# Patient Record
Sex: FEMALE | Race: WHITE | Employment: OTHER | URBAN - METROPOLITAN AREA
[De-identification: names, ages, dates, MRNs, and addresses within clinical notes are randomized per-mention and may not be internally consistent; named-entity substitution may affect disease eponyms.]

---

## 2017-05-19 ENCOUNTER — OFFICE VISIT (OUTPATIENT)
Dept: SURGERY | Age: 82
End: 2017-05-19

## 2017-05-19 VITALS
HEIGHT: 66 IN | SYSTOLIC BLOOD PRESSURE: 128 MMHG | DIASTOLIC BLOOD PRESSURE: 64 MMHG | HEART RATE: 56 BPM | BODY MASS INDEX: 30.37 KG/M2 | WEIGHT: 189 LBS

## 2017-05-19 DIAGNOSIS — C73 THYROID CANCER (HCC): ICD-10-CM

## 2017-05-19 DIAGNOSIS — E89.0 POSTOPERATIVE HYPOTHYROIDISM: Primary | ICD-10-CM

## 2017-05-19 PROCEDURE — 99213 OFFICE O/P EST LOW 20 MIN: CPT | Performed by: INTERNAL MEDICINE

## 2017-05-19 PROCEDURE — 1123F ACP DISCUSS/DSCN MKR DOCD: CPT | Performed by: INTERNAL MEDICINE

## 2017-05-19 PROCEDURE — 4040F PNEUMOC VAC/ADMIN/RCVD: CPT | Performed by: INTERNAL MEDICINE

## 2017-05-19 PROCEDURE — 1036F TOBACCO NON-USER: CPT | Performed by: INTERNAL MEDICINE

## 2017-05-19 PROCEDURE — G8427 DOCREV CUR MEDS BY ELIG CLIN: HCPCS | Performed by: INTERNAL MEDICINE

## 2017-05-19 PROCEDURE — 1090F PRES/ABSN URINE INCON ASSESS: CPT | Performed by: INTERNAL MEDICINE

## 2017-05-19 PROCEDURE — G8400 PT W/DXA NO RESULTS DOC: HCPCS | Performed by: INTERNAL MEDICINE

## 2017-05-19 PROCEDURE — G8419 CALC BMI OUT NRM PARAM NOF/U: HCPCS | Performed by: INTERNAL MEDICINE

## 2017-05-19 RX ORDER — LEVOTHYROXINE SODIUM 0.12 MG/1
125 TABLET ORAL DAILY
Qty: 90 TABLET | Refills: 3 | Status: SHIPPED | OUTPATIENT
Start: 2017-05-19 | End: 2018-05-14 | Stop reason: SDUPTHER

## 2018-05-07 DIAGNOSIS — E89.0 POSTOPERATIVE HYPOTHYROIDISM: ICD-10-CM

## 2018-05-07 LAB
ANION GAP SERPL CALCULATED.3IONS-SCNC: 14 MEQ/L (ref 7–13)
BUN BLDV-MCNC: 32 MG/DL (ref 8–23)
CALCIUM SERPL-MCNC: 8.9 MG/DL (ref 8.6–10.2)
CHLORIDE BLD-SCNC: 108 MEQ/L (ref 98–107)
CO2: 25 MEQ/L (ref 22–29)
CREAT SERPL-MCNC: 1.06 MG/DL (ref 0.5–0.9)
GFR AFRICAN AMERICAN: 59.6
GFR NON-AFRICAN AMERICAN: 49.3
GLUCOSE BLD-MCNC: 88 MG/DL (ref 74–109)
POTASSIUM SERPL-SCNC: 4.5 MEQ/L (ref 3.5–5.1)
SODIUM BLD-SCNC: 147 MEQ/L (ref 132–144)
T4 FREE: 1.56 NG/DL (ref 0.93–1.7)
TSH SERPL DL<=0.05 MIU/L-ACNC: 3.94 UIU/ML (ref 0.27–4.2)

## 2018-05-12 LAB — THYROGLOBULIN BY LC-MS/MS, SERUM/PLASMA: <0.5 NG/ML (ref 1.3–31.8)

## 2018-05-14 ENCOUNTER — OFFICE VISIT (OUTPATIENT)
Dept: ENDOCRINOLOGY | Age: 83
End: 2018-05-14
Payer: MEDICARE

## 2018-05-14 VITALS
WEIGHT: 180 LBS | DIASTOLIC BLOOD PRESSURE: 76 MMHG | HEIGHT: 66 IN | SYSTOLIC BLOOD PRESSURE: 138 MMHG | BODY MASS INDEX: 28.93 KG/M2 | HEART RATE: 52 BPM

## 2018-05-14 DIAGNOSIS — E89.0 POSTOPERATIVE HYPOTHYROIDISM: Primary | ICD-10-CM

## 2018-05-14 DIAGNOSIS — N18.30 STAGE 3 CHRONIC KIDNEY DISEASE (HCC): ICD-10-CM

## 2018-05-14 PROCEDURE — 99213 OFFICE O/P EST LOW 20 MIN: CPT | Performed by: INTERNAL MEDICINE

## 2018-05-14 PROCEDURE — G8400 PT W/DXA NO RESULTS DOC: HCPCS | Performed by: INTERNAL MEDICINE

## 2018-05-14 PROCEDURE — G8427 DOCREV CUR MEDS BY ELIG CLIN: HCPCS | Performed by: INTERNAL MEDICINE

## 2018-05-14 PROCEDURE — G8419 CALC BMI OUT NRM PARAM NOF/U: HCPCS | Performed by: INTERNAL MEDICINE

## 2018-05-14 PROCEDURE — 1036F TOBACCO NON-USER: CPT | Performed by: INTERNAL MEDICINE

## 2018-05-14 PROCEDURE — 1090F PRES/ABSN URINE INCON ASSESS: CPT | Performed by: INTERNAL MEDICINE

## 2018-05-14 PROCEDURE — 1123F ACP DISCUSS/DSCN MKR DOCD: CPT | Performed by: INTERNAL MEDICINE

## 2018-05-14 PROCEDURE — 4040F PNEUMOC VAC/ADMIN/RCVD: CPT | Performed by: INTERNAL MEDICINE

## 2018-05-14 RX ORDER — LATANOPROST 50 UG/ML
SOLUTION/ DROPS OPHTHALMIC
COMMUNITY
Start: 2017-08-09

## 2018-05-14 RX ORDER — LEVOTHYROXINE SODIUM 0.12 MG/1
125 TABLET ORAL DAILY
Qty: 90 TABLET | Refills: 3 | Status: SHIPPED | OUTPATIENT
Start: 2018-05-14 | End: 2019-05-15 | Stop reason: SDUPTHER

## 2018-10-24 ENCOUNTER — OFFICE VISIT (OUTPATIENT)
Dept: CARDIOLOGY CLINIC | Age: 83
End: 2018-10-24
Payer: MEDICARE

## 2018-10-24 VITALS
BODY MASS INDEX: 29.25 KG/M2 | OXYGEN SATURATION: 98 % | HEIGHT: 66 IN | HEART RATE: 51 BPM | DIASTOLIC BLOOD PRESSURE: 80 MMHG | WEIGHT: 182 LBS | SYSTOLIC BLOOD PRESSURE: 130 MMHG

## 2018-10-24 DIAGNOSIS — E78.5 HYPERLIPIDEMIA, UNSPECIFIED HYPERLIPIDEMIA TYPE: ICD-10-CM

## 2018-10-24 DIAGNOSIS — R00.2 PALPITATIONS: ICD-10-CM

## 2018-10-24 DIAGNOSIS — I10 ESSENTIAL HYPERTENSION: Primary | ICD-10-CM

## 2018-10-24 PROCEDURE — 1123F ACP DISCUSS/DSCN MKR DOCD: CPT | Performed by: INTERNAL MEDICINE

## 2018-10-24 PROCEDURE — 1036F TOBACCO NON-USER: CPT | Performed by: INTERNAL MEDICINE

## 2018-10-24 PROCEDURE — G8400 PT W/DXA NO RESULTS DOC: HCPCS | Performed by: INTERNAL MEDICINE

## 2018-10-24 PROCEDURE — 1090F PRES/ABSN URINE INCON ASSESS: CPT | Performed by: INTERNAL MEDICINE

## 2018-10-24 PROCEDURE — G8427 DOCREV CUR MEDS BY ELIG CLIN: HCPCS | Performed by: INTERNAL MEDICINE

## 2018-10-24 PROCEDURE — 4040F PNEUMOC VAC/ADMIN/RCVD: CPT | Performed by: INTERNAL MEDICINE

## 2018-10-24 PROCEDURE — G8419 CALC BMI OUT NRM PARAM NOF/U: HCPCS | Performed by: INTERNAL MEDICINE

## 2018-10-24 PROCEDURE — 1101F PT FALLS ASSESS-DOCD LE1/YR: CPT | Performed by: INTERNAL MEDICINE

## 2018-10-24 PROCEDURE — G8484 FLU IMMUNIZE NO ADMIN: HCPCS | Performed by: INTERNAL MEDICINE

## 2018-10-24 PROCEDURE — 99204 OFFICE O/P NEW MOD 45 MIN: CPT | Performed by: INTERNAL MEDICINE

## 2018-10-24 ASSESSMENT — ENCOUNTER SYMPTOMS
COLOR CHANGE: 0
BACK PAIN: 0
ABDOMINAL DISTENTION: 0
CHEST TIGHTNESS: 0
CHOKING: 0
APNEA: 0
ABDOMINAL PAIN: 0

## 2018-10-24 NOTE — PROGRESS NOTES
mouth daily. No current facility-administered medications for this visit. Review of Systems:   Review of Systems   Constitutional: Negative for activity change and appetite change. HENT: Negative for congestion. Respiratory: Negative for apnea, choking and chest tightness. Cardiovascular: Negative for chest pain. Gastrointestinal: Negative for abdominal distention and abdominal pain. Endocrine: Negative for cold intolerance and heat intolerance. Genitourinary: Negative for dysuria and enuresis. Musculoskeletal: Negative for arthralgias and back pain. Skin: Negative for color change. Neurological: Negative for dizziness, seizures, syncope and light-headedness. Psychiatric/Behavioral: Negative for agitation, behavioral problems and confusion. Physical Examination:    /80 (Site: Left Upper Arm, Position: Sitting, Cuff Size: Large Adult)   Pulse 51   Ht 5' 6\" (1.676 m)   Wt 182 lb (82.6 kg)   SpO2 98%   BMI 29.38 kg/m²    Physical Exam   Constitutional: She appears healthy. No distress. HENT:   Mouth/Throat: Oropharynx is clear. Eyes: Pupils are equal, round, and reactive to light. Neck: Normal range of motion. No JVD present. Cardiovascular: Regular rhythm, S1 normal, S2 normal, normal heart sounds and intact distal pulses. Exam reveals no gallop. No murmur heard. Pulses:       Radial pulses are 2+ on the right side. Dorsalis pedis pulses are 2+ on the right side. Pulmonary/Chest: Effort normal and breath sounds normal. She has no wheezes. She has no rales. She exhibits no tenderness. Abdominal: Soft. Bowel sounds are normal.   Musculoskeletal: Normal range of motion. She exhibits no edema. Neurological: She is alert and oriented to person, place, and time. She has intact cranial nerves. Skin: Skin is warm and dry. No rash noted.        LABS:  CBC: No results found for: WBC, RBC, HGB, HCT, MCV, MCH, MCHC, RDW, PLT, MPV  Lipids:No results

## 2019-05-08 DIAGNOSIS — E89.0 POSTOPERATIVE HYPOTHYROIDISM: ICD-10-CM

## 2019-05-08 LAB
ANION GAP SERPL CALCULATED.3IONS-SCNC: 14 MEQ/L (ref 9–15)
BUN BLDV-MCNC: 28 MG/DL (ref 8–23)
CALCIUM SERPL-MCNC: 9.3 MG/DL (ref 8.5–9.9)
CHLORIDE BLD-SCNC: 106 MEQ/L (ref 95–107)
CO2: 24 MEQ/L (ref 20–31)
CREAT SERPL-MCNC: 1.06 MG/DL (ref 0.5–0.9)
GFR AFRICAN AMERICAN: 59.5
GFR NON-AFRICAN AMERICAN: 49.1
GLUCOSE BLD-MCNC: 111 MG/DL (ref 70–99)
POTASSIUM SERPL-SCNC: 4.3 MEQ/L (ref 3.4–4.9)
SODIUM BLD-SCNC: 144 MEQ/L (ref 135–144)
T4 FREE: 1.79 NG/DL (ref 0.84–1.68)
TSH SERPL DL<=0.05 MIU/L-ACNC: 1.82 UIU/ML (ref 0.44–3.86)

## 2019-05-15 ENCOUNTER — OFFICE VISIT (OUTPATIENT)
Dept: ENDOCRINOLOGY | Age: 84
End: 2019-05-15
Payer: MEDICARE

## 2019-05-15 VITALS
HEART RATE: 51 BPM | WEIGHT: 181 LBS | HEIGHT: 63 IN | SYSTOLIC BLOOD PRESSURE: 140 MMHG | DIASTOLIC BLOOD PRESSURE: 81 MMHG | BODY MASS INDEX: 32.07 KG/M2

## 2019-05-15 DIAGNOSIS — E89.0 POSTOPERATIVE HYPOTHYROIDISM: Primary | ICD-10-CM

## 2019-05-15 PROCEDURE — 1123F ACP DISCUSS/DSCN MKR DOCD: CPT | Performed by: INTERNAL MEDICINE

## 2019-05-15 PROCEDURE — 1036F TOBACCO NON-USER: CPT | Performed by: INTERNAL MEDICINE

## 2019-05-15 PROCEDURE — G8417 CALC BMI ABV UP PARAM F/U: HCPCS | Performed by: INTERNAL MEDICINE

## 2019-05-15 PROCEDURE — 4040F PNEUMOC VAC/ADMIN/RCVD: CPT | Performed by: INTERNAL MEDICINE

## 2019-05-15 PROCEDURE — 1090F PRES/ABSN URINE INCON ASSESS: CPT | Performed by: INTERNAL MEDICINE

## 2019-05-15 PROCEDURE — G8427 DOCREV CUR MEDS BY ELIG CLIN: HCPCS | Performed by: INTERNAL MEDICINE

## 2019-05-15 PROCEDURE — 99213 OFFICE O/P EST LOW 20 MIN: CPT | Performed by: INTERNAL MEDICINE

## 2019-05-15 RX ORDER — LEVOTHYROXINE SODIUM 0.12 MG/1
125 TABLET ORAL DAILY
Qty: 90 TABLET | Refills: 3 | Status: SHIPPED | OUTPATIENT
Start: 2019-05-15

## 2019-05-15 NOTE — PROGRESS NOTES
Subjective:      Patient ID: Abimbola Silver is a 80 y.o. female. 12 months f/u hypothyroidism   Other   This is a chronic (hypothyroidism) problem. The current episode started more than 1 year ago. The problem has been unchanged. Associated symptoms include fatigue. Exacerbated by: thyroidectomy. Treatments tried: synthyroid. The treatment provided moderate relief. Pt c/o fatigue and has to take naps     Does not want to see a neurologist for her myasthenia gravis which is in remission  as per patient    Results for [place] Grade (MRN [id number]) as of 5/15/2019 13:35   Ref. Range 5/8/2019 11:16   Sodium Latest Ref Range: 135 - 144 mEq/L 144   Potassium Latest Ref Range: 3.4 - 4.9 mEq/L 4.3   Chloride Latest Ref Range: 95 - 107 mEq/L 106   CO2 Latest Ref Range: 20 - 31 mEq/L 24   BUN Latest Ref Range: 8 - 23 mg/dL 28 (H)   Creatinine Latest Ref Range: 0.50 - 0.90 mg/dL 1.06 (H)   Anion Gap Latest Ref Range: 9 - 15 mEq/L 14   GFR Non- Latest Ref Range: >60  49.1 (L)   GFR  Latest Ref Range: >60  59.5 (L)   Glucose Latest Ref Range: 70 - 99 mg/dL 111 (H)   Calcium Latest Ref Range: 8.5 - 9.9 mg/dL 9.3   TSH Latest Ref Range: 0.440 - 3.860 uIU/mL 1.820   T4 Free Latest Ref Range: 0.84 - 1.68 ng/dL 1.79 (H)           Patient Active Problem List   Diagnosis    Hypothyroidism    Thyroid cancer (HCC)    Myasthenia gravis (HCC)    CKD (chronic kidney disease)       Allergies   Allergen Reactions    Morphine     Promethazine     Sulfa Antibiotics          Current Outpatient Medications:     acetaminophen (TYLENOL) 80 MG suppository, Place 80 mg rectally every 4 hours as needed for Fever, Disp: , Rfl:     aspirin 81 MG tablet, Take by mouth MON-WED-FRI ONLY, Disp: , Rfl:     levothyroxine (SYNTHROID) 125 MCG tablet, Take 1 tablet by mouth Daily, Disp: 90 tablet, Rfl: 03    latanoprost (XALATAN) 0.005 % ophthalmic solution, Use 1 Drop in both eyes daily at bedtime. , Disp: , Rfl:     Cholecalciferol (VITAMIN D3) 2000 UNITS CAPS, Take  by mouth Daily. , Disp: , Rfl:     amlodipine (NORVASC) 5 MG tablet, Take 5 mg by mouth daily. , Disp: , Rfl:     atenolol (TENORMIN) 25 MG tablet, Take 25 mg by mouth daily. , Disp: , Rfl:     ezetimibe (ZETIA) 10 MG tablet, Take 10 mg by mouth daily. , Disp: , Rfl:             Review of Systems   Constitutional: Positive for fatigue. All other systems reviewed and are negative. Vitals:    05/15/19 1325 05/15/19 1329   BP: (!) 148/80 (!) 140/81   Pulse: 51    Weight: 181 lb (82.1 kg)    Height: 5' 3\" (1.6 m)        Physical Exam   Constitutional: She appears well-developed and well-nourished. HENT:   Head: Normocephalic and atraumatic. Neck: Neck supple. Cardiovascular: Bradycardia present. Musculoskeletal: Normal range of motion. Neurological: She is alert. Psychiatric: She has a normal mood and affect. Assessment:       Diagnosis Orders   1.  Postoperative hypothyroidism             Plan:       Orders Placed This Encounter   Procedures    T4, Free     Standing Status:   Future     Standing Expiration Date:   5/15/2020    TSH without Reflex     Standing Status:   Future     Standing Expiration Date:   5/15/2020    Basic Metabolic Panel     Standing Status:   Future     Standing Expiration Date:   5/15/2020    Thyroglobulin     Standing Status:   Future     Standing Expiration Date:   5/15/2020     Orders Placed This Encounter   Medications    levothyroxine (SYNTHROID) 125 MCG tablet     Sig: Take 1 tablet by mouth Daily     Dispense:  90 tablet     Refill:  03         f/u in 12 months

## 2019-05-23 LAB — THYROGLOBULIN BY LC-MS/MS, SERUM/PLASMA: 1.2 NG/ML (ref 1.3–31.8)

## 2019-10-03 ENCOUNTER — OFFICE VISIT (OUTPATIENT)
Dept: CARDIOLOGY CLINIC | Age: 84
End: 2019-10-03
Payer: MEDICARE

## 2019-10-03 VITALS
OXYGEN SATURATION: 97 % | BODY MASS INDEX: 32.6 KG/M2 | DIASTOLIC BLOOD PRESSURE: 70 MMHG | SYSTOLIC BLOOD PRESSURE: 120 MMHG | HEART RATE: 57 BPM | WEIGHT: 184 LBS | HEIGHT: 63 IN

## 2019-10-03 DIAGNOSIS — G70.00 MYASTHENIA GRAVIS (HCC): ICD-10-CM

## 2019-10-03 DIAGNOSIS — I47.1 SVT (SUPRAVENTRICULAR TACHYCARDIA) (HCC): Primary | ICD-10-CM

## 2019-10-03 DIAGNOSIS — E78.5 HYPERLIPIDEMIA, UNSPECIFIED HYPERLIPIDEMIA TYPE: ICD-10-CM

## 2019-10-03 DIAGNOSIS — I10 ESSENTIAL HYPERTENSION: ICD-10-CM

## 2019-10-03 DIAGNOSIS — R00.2 PALPITATIONS: ICD-10-CM

## 2019-10-03 DIAGNOSIS — Z00.00 PE (PHYSICAL EXAM), ANNUAL: ICD-10-CM

## 2019-10-03 PROCEDURE — 1036F TOBACCO NON-USER: CPT | Performed by: INTERNAL MEDICINE

## 2019-10-03 PROCEDURE — 4040F PNEUMOC VAC/ADMIN/RCVD: CPT | Performed by: INTERNAL MEDICINE

## 2019-10-03 PROCEDURE — 99212 OFFICE O/P EST SF 10 MIN: CPT | Performed by: INTERNAL MEDICINE

## 2019-10-03 PROCEDURE — 1090F PRES/ABSN URINE INCON ASSESS: CPT | Performed by: INTERNAL MEDICINE

## 2019-10-03 PROCEDURE — G8427 DOCREV CUR MEDS BY ELIG CLIN: HCPCS | Performed by: INTERNAL MEDICINE

## 2019-10-03 PROCEDURE — G8417 CALC BMI ABV UP PARAM F/U: HCPCS | Performed by: INTERNAL MEDICINE

## 2019-10-03 PROCEDURE — G8484 FLU IMMUNIZE NO ADMIN: HCPCS | Performed by: INTERNAL MEDICINE

## 2019-10-03 PROCEDURE — 1123F ACP DISCUSS/DSCN MKR DOCD: CPT | Performed by: INTERNAL MEDICINE

## 2019-11-11 ENCOUNTER — OFFICE VISIT (OUTPATIENT)
Dept: NEUROLOGY | Age: 84
End: 2019-11-11
Payer: MEDICARE

## 2019-11-11 VITALS
DIASTOLIC BLOOD PRESSURE: 77 MMHG | WEIGHT: 183 LBS | BODY MASS INDEX: 32.43 KG/M2 | HEIGHT: 63 IN | HEART RATE: 52 BPM | SYSTOLIC BLOOD PRESSURE: 151 MMHG

## 2019-11-11 DIAGNOSIS — G70.00 MYASTHENIA GRAVIS (HCC): ICD-10-CM

## 2019-11-11 DIAGNOSIS — G70.00 MYASTHENIA GRAVIS (HCC): Primary | ICD-10-CM

## 2019-11-11 PROCEDURE — 99205 OFFICE O/P NEW HI 60 MIN: CPT | Performed by: PSYCHIATRY & NEUROLOGY

## 2019-11-11 RX ORDER — PYRIDOSTIGMINE BROMIDE 60 MG/1
TABLET ORAL
Qty: 30 TABLET | Refills: 3 | Status: SHIPPED | OUTPATIENT
Start: 2019-11-11 | End: 2019-12-16 | Stop reason: SDUPTHER

## 2019-11-11 RX ORDER — PYRIDOSTIGMINE BROMIDE 60 MG/1
60 TABLET ORAL 3 TIMES DAILY
Qty: 60 TABLET | Refills: 3 | Status: SHIPPED | OUTPATIENT
Start: 2019-11-11 | End: 2019-11-11 | Stop reason: SDUPTHER

## 2019-11-11 ASSESSMENT — ENCOUNTER SYMPTOMS
NAUSEA: 0
BACK PAIN: 0
SHORTNESS OF BREATH: 0
TROUBLE SWALLOWING: 0
VOMITING: 0
PHOTOPHOBIA: 0
CHOKING: 0

## 2019-11-14 LAB — ACETYLCHOL MODUL AB: 20 %

## 2019-11-15 LAB — ACETYLCHOLINE BINDING ANTIBODY: 0.6 NMOL/L (ref 0–0.4)

## 2019-12-16 RX ORDER — PYRIDOSTIGMINE BROMIDE 60 MG/1
TABLET ORAL
Qty: 10 TABLET | Refills: 0 | Status: SHIPPED | OUTPATIENT
Start: 2019-12-16 | End: 2020-01-31 | Stop reason: SDUPTHER

## 2020-01-31 ENCOUNTER — OFFICE VISIT (OUTPATIENT)
Dept: NEUROLOGY | Age: 85
End: 2020-01-31
Payer: MEDICARE

## 2020-01-31 VITALS — BODY MASS INDEX: 33.3 KG/M2 | DIASTOLIC BLOOD PRESSURE: 62 MMHG | SYSTOLIC BLOOD PRESSURE: 132 MMHG | WEIGHT: 188 LBS

## 2020-01-31 PROCEDURE — 1036F TOBACCO NON-USER: CPT | Performed by: PSYCHIATRY & NEUROLOGY

## 2020-01-31 PROCEDURE — 99214 OFFICE O/P EST MOD 30 MIN: CPT | Performed by: PSYCHIATRY & NEUROLOGY

## 2020-01-31 PROCEDURE — 1090F PRES/ABSN URINE INCON ASSESS: CPT | Performed by: PSYCHIATRY & NEUROLOGY

## 2020-01-31 PROCEDURE — 1123F ACP DISCUSS/DSCN MKR DOCD: CPT | Performed by: PSYCHIATRY & NEUROLOGY

## 2020-01-31 PROCEDURE — G8427 DOCREV CUR MEDS BY ELIG CLIN: HCPCS | Performed by: PSYCHIATRY & NEUROLOGY

## 2020-01-31 PROCEDURE — 4040F PNEUMOC VAC/ADMIN/RCVD: CPT | Performed by: PSYCHIATRY & NEUROLOGY

## 2020-01-31 PROCEDURE — G8417 CALC BMI ABV UP PARAM F/U: HCPCS | Performed by: PSYCHIATRY & NEUROLOGY

## 2020-01-31 PROCEDURE — G8482 FLU IMMUNIZE ORDER/ADMIN: HCPCS | Performed by: PSYCHIATRY & NEUROLOGY

## 2020-01-31 RX ORDER — PYRIDOSTIGMINE BROMIDE 60 MG/1
60 TABLET ORAL 2 TIMES DAILY
Qty: 180 TABLET | Refills: 1 | Status: SHIPPED | OUTPATIENT
Start: 2020-01-31 | End: 2021-02-12 | Stop reason: SDUPTHER

## 2020-01-31 ASSESSMENT — ENCOUNTER SYMPTOMS
BACK PAIN: 0
CHOKING: 0
NAUSEA: 0
VOMITING: 0
SHORTNESS OF BREATH: 0
PHOTOPHOBIA: 0
TROUBLE SWALLOWING: 0

## 2020-01-31 NOTE — PROGRESS NOTES
Subjective:      Patient ID: Keyana Luque is a 80 y.o. female who presents today for:  Chief Complaint   Patient presents with    Follow-up     patient is getting stronger she feels like the medication she is on is helping she is now able to lift things out of the cabnet that she wasnt able to before. HPI 68-year-old right-handed female with a known history of myasthenia gravis. Patient was seen here due to worsening of myasthenia gravis. We had seen her  a few years ago and she did not not think that she had myasthenia gravis though at that time she had elevated titers of acetylcholine receptor antibodies. A repeat titer showed binding antibodies to be elevated. And this is quite diagnostic of myasthenia gravis. Her blocking antibodies appear to be normal.  Patient is responsive to Mestinon and she is only on 30 mg twice a day without any GI side effects. Notes positive response to this with more ability to perform her tasks and she has not any major issues with speech. The only complaint she has is nighttime coughing which may be part of difficulty swallowing at night. Has any other side effects of medication her only other issues appears to be chronic low back pain she has had 2 operative procedures done and she is now seeing pain management which is helping. Past Medical History:   Diagnosis Date    Hyperlipidemia     Hypothyroidism     Myasthenia gravis ([facility] [address]      No past surgical history on file.   Social History     Socioeconomic History    Marital status:      Spouse name: Not on file    Number of children: Not on file    Years of education: Not on file    Highest education level: Not on file   Occupational History    Not on file   Social Needs    Financial resource strain: Not on file    Food insecurity:     Worry: Not on file     Inability: Not on file    Transportation needs:     Medical: Not on file     Non-medical: Not on file   Tobacco Use    Smoking status: Never Smoker    Smokeless tobacco: Never Used   Substance and Sexual Activity    Alcohol use: No    Drug use: No    Sexual activity: Not on file   Lifestyle    Physical activity:     Days per week: Not on file     Minutes per session: Not on file    Stress: Not on file   Relationships    Social connections:     Talks on phone: Not on file     Gets together: Not on file     Attends [employer] service: Not on file     Active member of club or organization: Not on file     Attends meetings of clubs or organizations: Not on file     Relationship status: Not on file    Intimate partner violence:     Fear of current or ex partner: Not on file     Emotionally abused: Not on file     Physically abused: Not on file     Forced sexual activity: Not on file   Other Topics Concern    Not on file   Social History Narrative    Not on file     No family history on file. Allergies   Allergen Reactions    Morphine     Progesterone     Promethazine     Sulfa Antibiotics          Review of Systems   Constitutional: Negative for fever. HENT: Negative for ear pain, tinnitus and trouble swallowing. Eyes: Negative for photophobia and visual disturbance. Respiratory: Negative for choking and shortness of breath. Cardiovascular: Negative for chest pain and palpitations. Gastrointestinal: Negative for nausea and vomiting. Musculoskeletal: Negative for back pain, gait problem, joint swelling, myalgias, neck pain and neck stiffness. Neurological: Negative for dizziness, tremors, seizures, syncope, facial asymmetry, speech difficulty, weakness, light-headedness, numbness and headaches. Psychiatric/Behavioral: Negative for behavioral problems, confusion, hallucinations and sleep disturbance. Objective:   /62 (Site: Right Upper Arm, Position: Sitting, Cuff Size: Medium Adult)   Wt 188 lb (85.3 kg)   BMI 33.30 kg/m²     Physical Exam  Vitals signs reviewed.    Eyes:      Pupils: Pupils are equal, round, and reactive to light. Neck:      Musculoskeletal: Normal range of motion. Cardiovascular:      Rate and Rhythm: Normal rate and regular rhythm. Heart sounds: No murmur. Pulmonary:      Effort: Pulmonary effort is normal.      Breath sounds: Normal breath sounds. Musculoskeletal: Normal range of motion. Neurological:      Mental Status: She is alert and oriented to person, place, and time. Cranial Nerves: No cranial nerve deficit. Sensory: No sensory deficit. Motor: No abnormal muscle tone. Coordination: Coordination normal.      Deep Tendon Reflexes: Reflexes are normal and symmetric. Babinski sign absent on the right side. Babinski sign absent on the left side. No results found. No results found for: WBC, RBC, HGB, HCT, MCV, MCH, MCHC, RDW, PLT, MPV  Lab Results   Component Value Date     05/08/2019    K 4.3 05/08/2019     05/08/2019    CO2 24 05/08/2019    BUN 28 05/08/2019    CREATININE 1.06 05/08/2019    GFRAA 59.5 05/08/2019    LABGLOM 49.1 05/08/2019    GLUCOSE 111 05/08/2019    GLUCOSE 103 03/07/2012    CALCIUM 9.3 05/08/2019     No results found for: PROTIME, INR  Lab Results   Component Value Date    TSH 1.820 05/08/2019     No results found for: TRIG, HDL, LDLCALC, LDLDIRECT, LABVLDL  No results found for: LABAMPH, BARBSCNU, LABBENZ, CANNAB, COCAINESCRN, LABMETH, OPIATESCREENURINE, PHENCYCLIDINESCREENURINE, PPXUR, ETOH  No results found for: LITHIUM, DILFRTOT, VALPROATE    Assessment:       Diagnosis Orders   1. Myasthenia gravis ([address]  pyridostigmine (MESTINON) 60 MG tablet   2. Lumbar radiculopathy     Myasthenia gravis diagnosed many years ago by us. Patient at that time was not inclined to take medication as she felt that the myasthenia had improved and she came back to us few months ago with dysarthria and general weakness.   We had repeated her laboratory tests elevated acetylcholine binding receptor antibodies which are elevated which is diagnostic for myasthenia gravis. Her modulating antibodies appear to be normal blocking were never done. She is responsive to Mestinon without side effect she is only on 30 mg twice a day. I recommended that we increase this to 60 mg twice a day. She is having some nighttime cough which might be part of the swallowing difficulties that occurred during the night. In the event this continues we may consider long-acting Timespan Mestinon. Is not any other bulbar symptoms and her symptoms have improved which were mostly dysarthria and general weakness. Patient has lumbar radiculopathy with operative procedures and now under pain management. She reports no side effects of Mestinon which are GI side effects and she has not developed any urinary retention or glaucoma. Plan:      No orders of the defined types were placed in this encounter. Orders Placed This Encounter   Medications    pyridostigmine (MESTINON) 60 MG tablet     Sig: Take 1 tablet by mouth 2 times daily     Dispense:  180 tablet     Refill:  1       No follow-ups on file.       Theresa Cardoso MD

## 2020-02-13 ENCOUNTER — OUTSIDE SERVICES (OUTPATIENT)
Dept: NEUROLOGY | Age: 85
End: 2020-02-13

## 2020-02-13 ENCOUNTER — OUTSIDE SERVICES (OUTPATIENT)
Dept: NEUROLOGY | Age: 85
End: 2020-02-13
Payer: MEDICARE

## 2020-02-13 PROCEDURE — 95913 NRV CNDJ TEST 13/> STUDIES: CPT | Performed by: PSYCHIATRY & NEUROLOGY

## 2020-02-13 PROCEDURE — 95886 MUSC TEST DONE W/N TEST COMP: CPT | Performed by: PSYCHIATRY & NEUROLOGY

## 2020-04-03 ENCOUNTER — VIRTUAL VISIT (OUTPATIENT)
Dept: NEUROLOGY | Age: 85
End: 2020-04-03
Payer: MEDICARE

## 2020-04-03 PROBLEM — M19.079 PRIMARY OSTEOARTHRITIS, UNSPECIFIED ANKLE AND FOOT: Status: ACTIVE | Noted: 2018-10-18

## 2020-04-03 PROBLEM — I25.10 CORONARY ARTERY DISEASE INVOLVING NATIVE CORONARY ARTERY OF NATIVE HEART WITHOUT ANGINA PECTORIS: Status: ACTIVE | Noted: 2018-10-18

## 2020-04-03 PROBLEM — I10 ESSENTIAL HYPERTENSION: Status: ACTIVE | Noted: 2018-04-03

## 2020-04-03 PROBLEM — M19.032 PRIMARY OSTEOARTHRITIS, LEFT WRIST: Status: ACTIVE | Noted: 2018-12-17

## 2020-04-03 PROBLEM — M19.032 PRIMARY OSTEOARTHRITIS OF LEFT WRIST: Status: ACTIVE | Noted: 2018-12-17

## 2020-04-03 PROBLEM — M15.9 POLYOSTEOARTHRITIS, UNSPECIFIED: Status: ACTIVE | Noted: 2018-10-18

## 2020-04-03 PROBLEM — Z12.31 ENCOUNTER FOR SCREENING MAMMOGRAM FOR MALIGNANT NEOPLASM OF BREAST: Status: ACTIVE | Noted: 2018-10-18

## 2020-04-03 PROCEDURE — 99443 PR PHYS/QHP TELEPHONE EVALUATION 21-30 MIN: CPT | Performed by: PSYCHIATRY & NEUROLOGY

## 2020-04-03 ASSESSMENT — ENCOUNTER SYMPTOMS
SHORTNESS OF BREATH: 0
TROUBLE SWALLOWING: 0
BACK PAIN: 0
VOMITING: 0
NAUSEA: 0
PHOTOPHOBIA: 0
CHOKING: 0

## 2020-05-03 PROBLEM — Z12.31 ENCOUNTER FOR SCREENING MAMMOGRAM FOR MALIGNANT NEOPLASM OF BREAST: Status: RESOLVED | Noted: 2018-10-18 | Resolved: 2020-05-03

## 2020-05-14 DIAGNOSIS — E89.0 POSTOPERATIVE HYPOTHYROIDISM: ICD-10-CM

## 2020-05-14 LAB
ANION GAP SERPL CALCULATED.3IONS-SCNC: 11 MEQ/L (ref 9–15)
BUN BLDV-MCNC: 24 MG/DL (ref 8–23)
CALCIUM SERPL-MCNC: 9.5 MG/DL (ref 8.5–9.9)
CHLORIDE BLD-SCNC: 106 MEQ/L (ref 95–107)
CO2: 26 MEQ/L (ref 20–31)
CREAT SERPL-MCNC: 0.93 MG/DL (ref 0.5–0.9)
GFR AFRICAN AMERICAN: >60
GFR NON-AFRICAN AMERICAN: 57
GLUCOSE BLD-MCNC: 95 MG/DL (ref 70–99)
POTASSIUM SERPL-SCNC: 4.4 MEQ/L (ref 3.4–4.9)
SODIUM BLD-SCNC: 143 MEQ/L (ref 135–144)
T4 FREE: 1.83 NG/DL (ref 0.84–1.68)
TSH SERPL DL<=0.05 MIU/L-ACNC: 0.72 UIU/ML (ref 0.44–3.86)

## 2020-05-23 LAB — THYROGLOBULIN BY LC-MS/MS, SERUM/PLASMA: 3.1 NG/ML (ref 1.3–31.8)

## 2020-08-07 ENCOUNTER — OFFICE VISIT (OUTPATIENT)
Dept: NEUROLOGY | Age: 85
End: 2020-08-07
Payer: MEDICARE

## 2020-08-07 VITALS — SYSTOLIC BLOOD PRESSURE: 100 MMHG | DIASTOLIC BLOOD PRESSURE: 62 MMHG

## 2020-08-07 PROBLEM — G56.03 BILATERAL CARPAL TUNNEL SYNDROME: Status: ACTIVE | Noted: 2020-08-07

## 2020-08-07 PROCEDURE — 1090F PRES/ABSN URINE INCON ASSESS: CPT | Performed by: PSYCHIATRY & NEUROLOGY

## 2020-08-07 PROCEDURE — G8417 CALC BMI ABV UP PARAM F/U: HCPCS | Performed by: PSYCHIATRY & NEUROLOGY

## 2020-08-07 PROCEDURE — G8427 DOCREV CUR MEDS BY ELIG CLIN: HCPCS | Performed by: PSYCHIATRY & NEUROLOGY

## 2020-08-07 PROCEDURE — 1036F TOBACCO NON-USER: CPT | Performed by: PSYCHIATRY & NEUROLOGY

## 2020-08-07 PROCEDURE — 1123F ACP DISCUSS/DSCN MKR DOCD: CPT | Performed by: PSYCHIATRY & NEUROLOGY

## 2020-08-07 PROCEDURE — 4040F PNEUMOC VAC/ADMIN/RCVD: CPT | Performed by: PSYCHIATRY & NEUROLOGY

## 2020-08-07 PROCEDURE — 99214 OFFICE O/P EST MOD 30 MIN: CPT | Performed by: PSYCHIATRY & NEUROLOGY

## 2020-08-07 ASSESSMENT — ENCOUNTER SYMPTOMS
SHORTNESS OF BREATH: 0
PHOTOPHOBIA: 0
NAUSEA: 0
BACK PAIN: 0
TROUBLE SWALLOWING: 0
COLOR CHANGE: 0
CHOKING: 0
VOMITING: 0

## 2020-08-07 NOTE — PROGRESS NOTES
Subjective:      Patient ID: Chasity Akbar is a 80 y.o. female who presents today for:  Chief Complaint   Patient presents with    Follow-up     pt has been doing okay but numbness in her hands       HPI 80-year-old right-handed female with a known history of myasthenia gravis. PCR for all the symptoms as well. Substation she had a EMG nerve conduction study which showed significant carpal tunnel syndrome her last evaluation was virtual.  We had recommended that surgical intervention may not help though may prevent further progression of the pathology and she did have surgery and still has numbness patient is on Mestinon 60 mg though she is only taking 1 tablet she does have GI upset so. We had recommended half twice or 3 times a day. We discussed this further we are here to discuss her EMG findings. He is quite crippled in her hands due to arthritis and severe carpal tunnel syndrome    Past Medical History:   Diagnosis Date    Hyperlipidemia     Hypothyroidism     Myasthenia gravis ([facility] [address]      No past surgical history on file.   Social History     Socioeconomic History    Marital status:      Spouse name: Not on file    Number of children: Not on file    Years of education: Not on file    Highest education level: Not on file   Occupational History    Not on file   Social Needs    Financial resource strain: Not on file    Food insecurity     Worry: Not on file     Inability: Not on file    Transportation needs     Medical: Not on file     Non-medical: Not on file   Tobacco Use    Smoking status: Never Smoker    Smokeless tobacco: Never Used   Substance and Sexual Activity    Alcohol use: No    Drug use: No    Sexual activity: Not on file   Lifestyle    Physical activity     Days per week: Not on file     Minutes per session: Not on file    Stress: Not on file   Relationships    Social connections     Talks on phone: Not on file     Gets together: Not on file     Attends [employer] service: Not on file     Active member of club or organization: Not on file     Attends meetings of clubs or organizations: Not on file     Relationship status: Not on file    Intimate partner violence     Fear of current or ex partner: Not on file     Emotionally abused: Not on file     Physically abused: Not on file     Forced sexual activity: Not on file   Other Topics Concern    Not on file   Social History Narrative    Not on file     No family history on file. Allergies   Allergen Reactions    Morphine     Progesterone     Promethazine     Sulfa Antibiotics        Current Outpatient Medications   Medication Sig Dispense Refill    pyridostigmine (MESTINON) 60 MG tablet Take 1 tablet by mouth 2 times daily (Patient taking differently: Take 60 mg by mouth 2 times daily Pt is taking 1 a day) 180 tablet 1    acetaminophen (TYLENOL) 80 MG suppository Place 80 mg rectally every 4 hours as needed for Fever      levothyroxine (SYNTHROID) 125 MCG tablet Take 1 tablet by mouth Daily 90 tablet 03    aspirin 81 MG tablet Take by mouth MON-WED-FRI ONLY      latanoprost (XALATAN) 0.005 % ophthalmic solution Use 1 Drop in both eyes daily at bedtime.  Cholecalciferol (VITAMIN D3) 2000 UNITS CAPS Take  by mouth Daily.  amlodipine (NORVASC) 5 MG tablet Take 5 mg by mouth daily.  atenolol (TENORMIN) 25 MG tablet Take 25 mg by mouth daily.  ezetimibe (ZETIA) 10 MG tablet Take 10 mg by mouth daily.  diclofenac sodium 1 % GEL APPLY FOUR GRAMS TWICE DAILY AS NEEDED  0     No current facility-administered medications for this visit. Review of Systems   Constitutional: Negative for fever. HENT: Negative for ear pain, tinnitus and trouble swallowing. Eyes: Negative for photophobia and visual disturbance. Respiratory: Negative for choking and shortness of breath. Cardiovascular: Negative for chest pain and palpitations. Gastrointestinal: Negative for nausea and vomiting. Musculoskeletal: Positive for joint swelling. Negative for back pain, gait problem, myalgias, neck pain and neck stiffness. Skin: Negative for color change. Allergic/Immunologic: Negative for food allergies. Neurological: Positive for weakness and numbness. Negative for dizziness, tremors, seizures, syncope, facial asymmetry, speech difficulty, light-headedness and headaches. Psychiatric/Behavioral: Negative for behavioral problems, confusion, hallucinations and sleep disturbance. Objective:   /62     Physical Exam  Vitals signs reviewed. Eyes:      Pupils: Pupils are equal, round, and reactive to light. Neck:      Musculoskeletal: Normal range of motion. Cardiovascular:      Rate and Rhythm: Normal rate and regular rhythm. Heart sounds: No murmur. Pulmonary:      Effort: Pulmonary effort is normal.      Breath sounds: Normal breath sounds. Abdominal:      General: Bowel sounds are normal.   Musculoskeletal: Normal range of motion. Skin:     General: Skin is warm. Neurological:      Mental Status: She is alert and oriented to person, place, and time. Cranial Nerves: No cranial nerve deficit. Sensory: No sensory deficit. Motor: No abnormal muscle tone. Coordination: Coordination normal.      Deep Tendon Reflexes: Reflexes are normal and symmetric. Babinski sign absent on the right side. Babinski sign absent on the left side. Psychiatric:         Mood and Affect: Mood normal.     Patient has arthritis in her hands and significant wasting of the abductor pollicis brevis muscle. She is not myelopathic. She walks with a cane no ptosis is noted today. No results found.     No results found for: WBC, RBC, HGB, HCT, MCV, MCH, MCHC, RDW, PLT, MPV  Lab Results   Component Value Date     05/14/2020    K 4.4 05/14/2020     05/14/2020    CO2 26 05/14/2020    BUN 24 05/14/2020    CREATININE 0.93 05/14/2020    GFRAA >60.0 05/14/2020    LABGLOM 57.0 05/14/2020    GLUCOSE 95 05/14/2020    GLUCOSE 103 03/07/2012    CALCIUM 9.5 05/14/2020     No results found for: PROTIME, INR  Lab Results   Component Value Date    TSH 0.717 05/14/2020     No results found for: TRIG, HDL, LDLCALC, LDLDIRECT, LABVLDL  No results found for: LABAMPH, BARBSCNU, LABBENZ, CANNAB, COCAINESCRN, LABMETH, OPIATESCREENURINE, PHENCYCLIDINESCREENURINE, PPXUR, ETOH  No results found for: LITHIUM, DILFRTOT, VALPROATE    Assessment:       Diagnosis Orders   1. Myasthenia gravis ([facility] [address]     2. Bilateral carpal tunnel syndrome  Amb External Referral To Orthopedic Surgery   3. Primary osteoarthritis involving multiple joints     Myasthenia gravis which is well controlled patient is on pyridostigmine 60 mg appears not able to take 2 tablets as he has GI side effects. We recommend that she take half a tablet to 3 times a day as much she can tolerate. She is doing quite well on this situation. She  had come to see me for hand issues and we are obtain a EMG nuclear study shows severe carpal tunnel syndrome. The outcomes of surgery are likely to be quite guarded given that she is lost much of her muscles and may not be reversible but may prevent further progression of the pathology or may have some symptomatic relief. She is agreeable to consideration of surgery knowing the outcomes which we have discussed in much detail. We will will further refer to Dr. Keith Rollins for the same    We will keep an eye on this and review her in 6 months      Plan:      Orders Placed This Encounter   Procedures    Amb External Referral To Orthopedic Surgery     Referral Priority:   Routine     Referral Type:   Eval and Treat     Referral Reason:   Specialty Services Required     Referred to Provider:   Johnna Cain MD     Requested Specialty:   Orthopedic Surgery     Number of Visits Requested:   1     No orders of the defined types were placed in this encounter. No follow-ups on file.       Hilda Beach MD

## 2020-10-22 ENCOUNTER — OFFICE VISIT (OUTPATIENT)
Dept: CARDIOLOGY CLINIC | Age: 85
End: 2020-10-22
Payer: MEDICARE

## 2020-10-22 VITALS
WEIGHT: 181.6 LBS | SYSTOLIC BLOOD PRESSURE: 110 MMHG | BODY MASS INDEX: 32.17 KG/M2 | TEMPERATURE: 97.4 F | HEART RATE: 51 BPM | DIASTOLIC BLOOD PRESSURE: 80 MMHG

## 2020-10-22 PROCEDURE — G8417 CALC BMI ABV UP PARAM F/U: HCPCS | Performed by: INTERNAL MEDICINE

## 2020-10-22 PROCEDURE — 4040F PNEUMOC VAC/ADMIN/RCVD: CPT | Performed by: INTERNAL MEDICINE

## 2020-10-22 PROCEDURE — 1090F PRES/ABSN URINE INCON ASSESS: CPT | Performed by: INTERNAL MEDICINE

## 2020-10-22 PROCEDURE — 1123F ACP DISCUSS/DSCN MKR DOCD: CPT | Performed by: INTERNAL MEDICINE

## 2020-10-22 PROCEDURE — 1036F TOBACCO NON-USER: CPT | Performed by: INTERNAL MEDICINE

## 2020-10-22 PROCEDURE — G8484 FLU IMMUNIZE NO ADMIN: HCPCS | Performed by: INTERNAL MEDICINE

## 2020-10-22 PROCEDURE — 99213 OFFICE O/P EST LOW 20 MIN: CPT | Performed by: INTERNAL MEDICINE

## 2020-10-22 PROCEDURE — G8427 DOCREV CUR MEDS BY ELIG CLIN: HCPCS | Performed by: INTERNAL MEDICINE

## 2020-10-22 NOTE — PROGRESS NOTES
[facility] CARDIOLOGY OFFICE FOLLOW-UP      Patient: Miladis Garcia  YOB: 1933  MRN: [id number]    Chief Complaint:  Chief Complaint   Patient presents with    Coronary Artery Disease    Hypertension    1 Year Follow Up       Subjective/HPI    The patient is followed in the cardiology office chronically for the following problems: SVT, palpitations    The last encounter focused on the following: followup    Testing/hospitalizations/procedures performed since last encounter: none    Since the last encounter, the patient has been feeling dizzy at times only occasionally and worse with standing up quickly. Past Medical History:   Diagnosis Date    Hyperlipidemia     Hypothyroidism     Myasthenia gravis ([facility] [address]        No past surgical history on file. No family history on file.     Social History     Socioeconomic History    Marital status:      Spouse name: Not on file    Number of children: Not on file    Years of education: Not on file    Highest education level: Not on file   Occupational History    Not on file   Social Needs    Financial resource strain: Not on file    Food insecurity     Worry: Not on file     Inability: Not on file    Transportation needs     Medical: Not on file     Non-medical: Not on file   Tobacco Use    Smoking status: Never Smoker    Smokeless tobacco: Never Used   Substance and Sexual Activity    Alcohol use: No    Drug use: No    Sexual activity: Not on file   Lifestyle    Physical activity     Days per week: Not on file     Minutes per session: Not on file    Stress: Not on file   Relationships    Social connections     Talks on phone: Not on file     Gets together: Not on file     Attends Voodoo service: Not on file     Active member of club or organization: Not on file     Attends meetings of clubs or organizations: Not on file     Relationship status: Not on file    Intimate partner violence     Fear of current or ex partner: Not on file     Emotionally abused: Not on file     Physically abused: Not on file     Forced sexual activity: Not on file   Other Topics Concern    Not on file   Social History Narrative    Not on file       Allergies   Allergen Reactions    Morphine     Progesterone     Promethazine     Sulfa Antibiotics        Current Outpatient Medications   Medication Sig Dispense Refill    diclofenac sodium 1 % GEL APPLY FOUR GRAMS TWICE DAILY AS NEEDED  0    acetaminophen (TYLENOL) 80 MG suppository Place 80 mg rectally every 4 hours as needed for Fever      levothyroxine (SYNTHROID) 125 MCG tablet Take 1 tablet by mouth Daily 90 tablet 03    aspirin 81 MG tablet Take by mouth MON-WED-FRI ONLY      latanoprost (XALATAN) 0.005 % ophthalmic solution Use 1 Drop in both eyes daily at bedtime.  Cholecalciferol (VITAMIN D3) 2000 UNITS CAPS Take  by mouth Daily.  amlodipine (NORVASC) 5 MG tablet Take 5 mg by mouth daily.  atenolol (TENORMIN) 25 MG tablet Take 25 mg by mouth daily.  ezetimibe (ZETIA) 10 MG tablet Take 10 mg by mouth daily.  pyridostigmine (MESTINON) 60 MG tablet Take 1 tablet by mouth 2 times daily (Patient taking differently: Take 60 mg by mouth 2 times daily Pt is taking 1 a day) 180 tablet 1     No current facility-administered medications for this visit. Review of Systems:   Review of Systems   Constitutional: Negative for activity change and appetite change. HENT: Negative for congestion. Respiratory: Negative for apnea, choking and chest tightness. Cardiovascular: Negative for chest pain. Gastrointestinal: Negative for abdominal distention and abdominal pain. Endocrine: Negative for cold intolerance and heat intolerance. Genitourinary: Negative for dysuria and enuresis. Musculoskeletal: Negative for arthralgias and back pain. Skin: Negative for color change. Allergic/Immunologic: Negative.     Neurological: Negative for dizziness, seizures, syncope and light-headedness. Psychiatric/Behavioral: Negative for agitation, behavioral problems and confusion. Physical Examination:    /80 (Site: Left Upper Arm, Position: Sitting, Cuff Size: Large Adult)   Pulse 51   Temp 97.4 °F (36.3 °C) (Infrared)   Wt 181 lb 9.6 oz (82.4 kg)   BMI 32.17 kg/m²    Physical Exam   Constitutional: The patient appears healthy. No distress. HENT: Mouth/Throat: Oropharynx is clear. Eyes: Pupils are equal, round, and reactive to light. Neck: Normal range of motion. No JVD present. Cardiovascular: Regular rhythm, S1 normal, S2 normal, normal heart sounds and intact distal pulses. Exam reveals no gallop. No murmur heard. Pulses:       Radial pulses are 2+ on the right side. Dorsalis pedis pulses are 2+ on the right side. Pulmonary/Chest: Effort normal and breath sounds normal. No wheezes. No rales. No tenderness. Abdominal: Soft. Bowel sounds are normal.   Musculoskeletal: Normal range of motion. No edema. Neurological: The patient is alert and oriented to person, place, and time. Intact cranial nerves. Skin: Skin is warm and dry. No rash noted.        LABS:  CBC: No results found for: WBC, RBC, HGB, HCT, MCV, MCH, MCHC, RDW, PLT, MPV  Lipids:No results found for: CHOL  No results found for: TRIG  No results found for: HDL  No results found for: LDLCHOLESTEROL, LDLCALC  No results found for: LABVLDL, VLDL  No results found for: CHOLHDLRATIO  CMP:    Lab Results   Component Value Date     05/14/2020    K 4.4 05/14/2020     05/14/2020    CO2 26 05/14/2020    BUN 24 05/14/2020    CREATININE 0.93 05/14/2020    GFRAA >60.0 05/14/2020    LABGLOM 57.0 05/14/2020    GLUCOSE 95 05/14/2020    GLUCOSE 103 03/07/2012    CALCIUM 9.5 05/14/2020     BMP:    Lab Results   Component Value Date     05/14/2020    K 4.4 05/14/2020     05/14/2020    CO2 26 05/14/2020    BUN 24 05/14/2020    CREATININE 0.93 05/14/2020    CALCIUM 9.5 05/14/2020 GFRAA >60.0 05/14/2020    LABGLOM 57.0 05/14/2020    GLUCOSE 95 05/14/2020    GLUCOSE 103 03/07/2012     Magnesium:  No results found for: MG  TSH:  Lab Results   Component Value Date    TSH 0.717 05/14/2020       Patient Active Problem List   Diagnosis    Hypothyroidism, unspecified    Thyroid cancer ([facility] [address]    Myasthenia gravis ([facility]    Chronic kidney disease    Coronary artery disease involving native coronary artery of native heart without angina pectoris    Polyosteoarthritis, unspecified    Primary osteoarthritis of left wrist    Primary osteoarthritis, left wrist    Primary osteoarthritis, unspecified ankle and foot    Essential hypertension    Bilateral carpal tunnel syndrome       Medications:  Current Outpatient Medications   Medication Sig Dispense Refill    diclofenac sodium 1 % GEL APPLY FOUR GRAMS TWICE DAILY AS NEEDED  0    acetaminophen (TYLENOL) 80 MG suppository Place 80 mg rectally every 4 hours as needed for Fever      levothyroxine (SYNTHROID) 125 MCG tablet Take 1 tablet by mouth Daily 90 tablet 03    aspirin 81 MG tablet Take by mouth MON-WED-FRI ONLY      latanoprost (XALATAN) 0.005 % ophthalmic solution Use 1 Drop in both eyes daily at bedtime.  Cholecalciferol (VITAMIN D3) 2000 UNITS CAPS Take  by mouth Daily.  amlodipine (NORVASC) 5 MG tablet Take 5 mg by mouth daily.  atenolol (TENORMIN) 25 MG tablet Take 25 mg by mouth daily.  ezetimibe (ZETIA) 10 MG tablet Take 10 mg by mouth daily.  pyridostigmine (MESTINON) 60 MG tablet Take 1 tablet by mouth 2 times daily (Patient taking differently: Take 60 mg by mouth 2 times daily Pt is taking 1 a day) 180 tablet 1     No current facility-administered medications for this visit. Assessment/Plan:    1. Essential hypertension  Patient has essential hypertension on BP medications. The guideline-directed target for BP in this patient is <130/80.   In order to reach our target BP we will continue

## 2021-01-22 ENCOUNTER — OFFICE VISIT (OUTPATIENT)
Dept: ENDOCRINOLOGY | Age: 86
End: 2021-01-22
Payer: MEDICARE

## 2021-01-22 ENCOUNTER — HOSPITAL ENCOUNTER (OUTPATIENT)
Dept: ULTRASOUND IMAGING | Age: 86
Discharge: HOME | End: 2021-01-24
Payer: MEDICARE

## 2021-01-22 VITALS
WEIGHT: 181 LBS | DIASTOLIC BLOOD PRESSURE: 69 MMHG | SYSTOLIC BLOOD PRESSURE: 142 MMHG | BODY MASS INDEX: 30.16 KG/M2 | HEIGHT: 65 IN | HEART RATE: 59 BPM | OXYGEN SATURATION: 95 %

## 2021-01-22 DIAGNOSIS — E89.0 POSTOPERATIVE HYPOTHYROIDISM: Primary | ICD-10-CM

## 2021-01-22 DIAGNOSIS — C73 THYROID CANCER (HCC): ICD-10-CM

## 2021-01-22 DIAGNOSIS — E89.0 POSTOPERATIVE HYPOTHYROIDISM: ICD-10-CM

## 2021-01-22 LAB
T4 FREE: 1.56 NG/DL (ref 0.84–1.68)
TSH REFLEX: 3.24 UIU/ML (ref 0.44–3.86)

## 2021-01-22 PROCEDURE — 4040F PNEUMOC VAC/ADMIN/RCVD: CPT | Performed by: INTERNAL MEDICINE

## 2021-01-22 PROCEDURE — 76536 US EXAM OF HEAD AND NECK: CPT

## 2021-01-22 PROCEDURE — 99213 OFFICE O/P EST LOW 20 MIN: CPT | Performed by: INTERNAL MEDICINE

## 2021-01-22 PROCEDURE — 1090F PRES/ABSN URINE INCON ASSESS: CPT | Performed by: INTERNAL MEDICINE

## 2021-01-22 PROCEDURE — G8417 CALC BMI ABV UP PARAM F/U: HCPCS | Performed by: INTERNAL MEDICINE

## 2021-01-22 PROCEDURE — 1123F ACP DISCUSS/DSCN MKR DOCD: CPT | Performed by: INTERNAL MEDICINE

## 2021-01-22 PROCEDURE — G8484 FLU IMMUNIZE NO ADMIN: HCPCS | Performed by: INTERNAL MEDICINE

## 2021-01-22 PROCEDURE — G8427 DOCREV CUR MEDS BY ELIG CLIN: HCPCS | Performed by: INTERNAL MEDICINE

## 2021-01-22 PROCEDURE — 1036F TOBACCO NON-USER: CPT | Performed by: INTERNAL MEDICINE

## 2021-01-22 NOTE — PROGRESS NOTES
Subjective:      Patient ID: Byron Kulkarni is a 80 y.o. female. Follow-up on hypothyroidism history of thyroid cancer thyroglobulin level has been stable but has gone up from 1.3-3 range patient denies any lymph node enlargement has had left-sided neck and shoulder pain has been seeing ENT has also seen pain management and has rheumatologist history of myasthenia gravis being monitored followed up by neurologist last labs were reviewed no current labs to review otherwise  Other  This is a chronic (Hypothyroidism) problem. The current episode started more than 1 year ago. The problem occurs intermittently. The problem has been waxing and waning. Associated symptoms include myalgias. Pertinent negatives include no neck pain. Exacerbated by: Thyroidectomy. Treatments tried: Levothyroxine. The treatment provided moderate relief. Results for Alexys Lr (MRN [id number]) as of 1/22/2021 11:38   Ref.  Range 5/8/2019 11:16 11/11/2019 17:12 2/13/2020 00:00 5/14/2020 12:41   Sodium Latest Ref Range: 135 - 144 mEq/L 144   143   Potassium Latest Ref Range: 3.4 - 4.9 mEq/L 4.3   4.4   Chloride Latest Ref Range: 95 - 107 mEq/L 106   106   CO2 Latest Ref Range: 20 - 31 mEq/L 24   26   BUN Latest Ref Range: 8 - 23 mg/dL 28 (H)   24 (H)   Creatinine Latest Ref Range: 0.50 - 0.90 mg/dL 1.06 (H)   0.93 (H)   Anion Gap Latest Ref Range: 9 - 15 mEq/L 14   11   GFR Non- Latest Ref Range: >60  49.1 (L)   57.0 (L)   GFR  Latest Ref Range: >60  59.5 (L)   >60.0   Glucose Latest Ref Range: 70 - 99 mg/dL 111 (H)   95   Calcium Latest Ref Range: 8.5 - 9.9 mg/dL 9.3   9.5   TSH Latest Ref Range: 0.440 - 3.860 uIU/mL 1.820   0.717   T4 Free Latest Ref Range: 0.84 - 1.68 ng/dL 1.79 (H)   1.83 (H)   Acetylchol Modul Ab Latest Ref Range: <=45 %  20     ACETYLCHOLINE BINDING ANTIBODY Latest Ref Range: 0.0 - 0.4 nmol/L  0.6 (H)     EMG Unknown   Attch Thyroglobulin by LC-MS/MS, Serum/Plasma Latest Ref Range: 1.3 - 31.8 ng/mL 1.2 (L)   3.1       Results for Ho Hutson (MRN [id number]) as of 1/22/2021 11:26   Ref. Range 5/11/2016 11:51 5/15/2017 10:08 5/7/2018 11:42 5/8/2019 11:16 5/14/2020 12:41   Thyroglobulin by LC-MS/MS, Serum/Plasma Latest Ref Range: 1.3 - 31.8 ng/mL <0.5 (L) <0.5 (L) <0.5 (L) 1.2 (L) 3.1     Patient Active Problem List   Diagnosis    Hypothyroidism, unspecified    Thyroid cancer ([facility] [address]    Myasthenia gravis ([facility] [address]    Chronic kidney disease    Coronary artery disease involving native coronary artery of native heart without angina pectoris    Polyosteoarthritis, unspecified    Primary osteoarthritis of left wrist    Primary osteoarthritis, left wrist    Primary osteoarthritis, unspecified ankle and foot    Essential hypertension    Bilateral carpal tunnel syndrome     Allergies   Allergen Reactions    Morphine     Progesterone     Promethazine     Sulfa Antibiotics        Current Outpatient Medications:     diclofenac sodium 1 % GEL, APPLY FOUR GRAMS TWICE DAILY AS NEEDED, Disp: , Rfl: 0    acetaminophen (TYLENOL) 80 MG suppository, Place 80 mg rectally every 4 hours as needed for Fever, Disp: , Rfl:     levothyroxine (SYNTHROID) 125 MCG tablet, Take 1 tablet by mouth Daily, Disp: 90 tablet, Rfl: 03    aspirin 81 MG tablet, Take by mouth MON-WED-FRI ONLY, Disp: , Rfl:     latanoprost (XALATAN) 0.005 % ophthalmic solution, Use 1 Drop in both eyes daily at bedtime. , Disp: , Rfl:     Cholecalciferol (VITAMIN D3) 2000 UNITS CAPS, Take  by mouth Daily. , Disp: , Rfl:     amlodipine (NORVASC) 5 MG tablet, Take 5 mg by mouth daily. , Disp: , Rfl:     atenolol (TENORMIN) 25 MG tablet, Take 25 mg by mouth daily. , Disp: , Rfl:     ezetimibe (ZETIA) 10 MG tablet, Take 10 mg by mouth daily.   , Disp: , Rfl:   pyridostigmine (MESTINON) 60 MG tablet, Take 1 tablet by mouth 2 times daily (Patient taking differently: Take 60 mg by mouth 2 times daily Pt is taking 1 a day), Disp: 180 tablet, Rfl: 1      Review of Systems   Musculoskeletal: Positive for myalgias. Negative for neck pain. Hematological: Negative for adenopathy. Vitals:    01/22/21 1114 01/22/21 1117   BP: (!) 147/70 (!) 142/69   Pulse: 59    SpO2: 95%    Weight: 181 lb (82.1 kg)    Height: 5' 5\" (1.651 m)        Objective:   Physical Exam  Vitals signs reviewed. Constitutional:       Appearance: Normal appearance. She is obese. HENT:      Head: Normocephalic and atraumatic. Neck:      Musculoskeletal: Normal range of motion and neck supple. Cardiovascular:      Rate and Rhythm: Bradycardia present. Musculoskeletal: Normal range of motion. Neurological:      General: No focal deficit present. Mental Status: She is alert. Assessment:       Diagnosis Orders   1. Postoperative hypothyroidism  T4, Free    TSH with Reflex   2.  Thyroid cancer ([address]  Anti-Thyroglobulin Antibody    Thyroglobulin    US HEAD NECK SOFT TISSUE THYROID           Plan:      Orders Placed This Encounter   Procedures    US HEAD NECK SOFT TISSUE THYROID     Standing Status:   Future     Standing Expiration Date:   1/22/2022     Order Specific Question:   Reason for exam:     Answer:   higher thyroglobulin    T4, Free     Standing Status:   Future     Standing Expiration Date:   1/22/2022    TSH with Reflex     Standing Status:   Future     Standing Expiration Date:   1/22/2022    Anti-Thyroglobulin Antibody     Standing Status:   Future     Standing Expiration Date:   1/22/2022    Thyroglobulin     Standing Status:   Future     Standing Expiration Date:   1/22/2022     Continue current dose of levothyroxine 125 mcg daily patient to have thyroid function test thyroglobulin thyroglobulin antibody and thyroid ultrasound follow-up in 4 weeks time Beth Kingsley MD

## 2021-01-24 LAB — THYROGLOBULIN AB: <0.9 IU/ML (ref 0–4)

## 2021-01-25 DIAGNOSIS — E89.0 POSTOPERATIVE HYPOTHYROIDISM: Primary | ICD-10-CM

## 2021-02-01 DIAGNOSIS — E89.0 POSTOPERATIVE HYPOTHYROIDISM: Primary | ICD-10-CM

## 2021-02-01 LAB — THYROGLOBULIN BY LC-MS/MS, SERUM/PLASMA: 17.2 NG/ML (ref 1.3–31.8)

## 2021-02-12 ENCOUNTER — OFFICE VISIT (OUTPATIENT)
Dept: NEUROLOGY | Age: 86
End: 2021-02-12
Payer: MEDICARE

## 2021-02-12 VITALS
BODY MASS INDEX: 32.1 KG/M2 | WEIGHT: 181.2 LBS | DIASTOLIC BLOOD PRESSURE: 64 MMHG | SYSTOLIC BLOOD PRESSURE: 122 MMHG | HEART RATE: 62 BPM

## 2021-02-12 DIAGNOSIS — G24.9 DYSTONIA: ICD-10-CM

## 2021-02-12 DIAGNOSIS — G56.03 BILATERAL CARPAL TUNNEL SYNDROME: ICD-10-CM

## 2021-02-12 DIAGNOSIS — G70.00 MYASTHENIA GRAVIS (HCC): Primary | ICD-10-CM

## 2021-02-12 PROCEDURE — G8484 FLU IMMUNIZE NO ADMIN: HCPCS | Performed by: PSYCHIATRY & NEUROLOGY

## 2021-02-12 PROCEDURE — G8417 CALC BMI ABV UP PARAM F/U: HCPCS | Performed by: PSYCHIATRY & NEUROLOGY

## 2021-02-12 PROCEDURE — 99214 OFFICE O/P EST MOD 30 MIN: CPT | Performed by: PSYCHIATRY & NEUROLOGY

## 2021-02-12 PROCEDURE — 4040F PNEUMOC VAC/ADMIN/RCVD: CPT | Performed by: PSYCHIATRY & NEUROLOGY

## 2021-02-12 PROCEDURE — G8427 DOCREV CUR MEDS BY ELIG CLIN: HCPCS | Performed by: PSYCHIATRY & NEUROLOGY

## 2021-02-12 PROCEDURE — 1123F ACP DISCUSS/DSCN MKR DOCD: CPT | Performed by: PSYCHIATRY & NEUROLOGY

## 2021-02-12 PROCEDURE — 1036F TOBACCO NON-USER: CPT | Performed by: PSYCHIATRY & NEUROLOGY

## 2021-02-12 PROCEDURE — 1090F PRES/ABSN URINE INCON ASSESS: CPT | Performed by: PSYCHIATRY & NEUROLOGY

## 2021-02-12 RX ORDER — PYRIDOSTIGMINE BROMIDE 60 MG/1
60 TABLET ORAL 2 TIMES DAILY
Qty: 180 TABLET | Refills: 1 | Status: SHIPPED | OUTPATIENT
Start: 2021-02-12 | End: 2021-05-13

## 2021-02-12 ASSESSMENT — ENCOUNTER SYMPTOMS
BACK PAIN: 0
TROUBLE SWALLOWING: 0
COLOR CHANGE: 0
VOMITING: 0
SHORTNESS OF BREATH: 0
CHOKING: 0
PHOTOPHOBIA: 0
NAUSEA: 0

## 2021-02-12 NOTE — PROGRESS NOTES
Subjective:      Patient ID: Joe Clark is a 80 y.o. female who presents today for:  Chief Complaint   Patient presents with    6 Month Follow-Up     Patient states she is the usual fine. She says she currently has like pulled muscles in her back from turning her head the wrong way back in december. She says the pain goes from her back shoulder arear into the back of her head. She states her pcp has cared for it and they have been giving her injections for it. HPI 26-year-old right-handed seen history of myasthenia gravis. She also has carpal tunnel syndrome bilaterally. She is on Mestinon 60 mg though she is only taking 1 tablet as she does have GI upsets. Patient has severe arthritis and holding her hands patient has not any diarrhea denies any double vision no difficulty swallowing or choking. She was referred to orthopedics  Had discussed with her that surgery may or may not help she did undergo surgery on the left hand which has not helped and she is not contemplating surgery on the right hand. Her main issues appears to be neck pain. She had suddenly stretched her neck during [place] and she had to jerk and having pain on the left side. Patient had thyroid cancer in the past and there was a question of lymph nodes I am not feeling any and most of the pain appears to be from her neck and this is a cervical strain. This causes a headache in the posterior neck. Patient does have some degree of swallowing issues though she is not coming out with it though her  does tell me that. This is mostly for pills and water    Past Medical History:   Diagnosis Date    Hyperlipidemia     Hypothyroidism     Myasthenia gravis ([address]      No past surgical history on file.   Social History     Socioeconomic History    Marital status:      Spouse name: Not on file    Number of children: Not on file    Years of education: Not on file    Highest education level: Not on file Occupational History    Not on file   Social Needs    Financial resource strain: Not on file    Food insecurity     Worry: Not on file     Inability: Not on file    Transportation needs     Medical: Not on file     Non-medical: Not on file   Tobacco Use    Smoking status: Never Smoker    Smokeless tobacco: Never Used   Substance and Sexual Activity    Alcohol use: No    Drug use: No    Sexual activity: Not on file   Lifestyle    Physical activity     Days per week: Not on file     Minutes per session: Not on file    Stress: Not on file   Relationships    Social connections     Talks on phone: Not on file     Gets together: Not on file     Attends Scientology service: Not on file     Active member of club or organization: Not on file     Attends meetings of clubs or organizations: Not on file     Relationship status: Not on file    Intimate partner violence     Fear of current or ex partner: Not on file     Emotionally abused: Not on file     Physically abused: Not on file     Forced sexual activity: Not on file   Other Topics Concern    Not on file   Social History Narrative    Not on file     No family history on file. Allergies   Allergen Reactions    Morphine     Progesterone     Promethazine     Sulfa Antibiotics        Current Outpatient Medications   Medication Sig Dispense Refill    pyridostigmine (MESTINON) 60 MG tablet Take 1 tablet by mouth 2 times daily 180 tablet 1    levothyroxine (SYNTHROID) 125 MCG tablet Take 1 tablet by mouth Daily 90 tablet 03    aspirin 81 MG tablet Take by mouth MON-WED-FRI ONLY      latanoprost (XALATAN) 0.005 % ophthalmic solution Use 1 Drop in both eyes daily at bedtime.  Cholecalciferol (VITAMIN D3) 2000 UNITS CAPS Take  by mouth Daily.  amlodipine (NORVASC) 5 MG tablet Take 5 mg by mouth daily.  atenolol (TENORMIN) 25 MG tablet Take 25 mg by mouth daily.  ezetimibe (ZETIA) 10 MG tablet Take 10 mg by mouth daily. Motor: No abnormal muscle tone. Coordination: Coordination normal.      Deep Tendon Reflexes: Reflexes are normal and symmetric. Babinski sign absent on the right side. Babinski sign absent on the left side. Psychiatric:         Mood and Affect: Mood normal.       In addition to this patient has significant tightness of the trapezius muscle on the left with a mild dystonia. I do not appreciate any lymph nodes. She has mild degree of arthritis in her hands. No ptosis is noted  Us Head Neck Soft Tissue Thyroid    Result Date: 1/25/2021  EXAMINATION: Ultrasound head/neck soft tissues. CLINICAL HISTORY: History of thyroidectomy for thyroid cancer 2008. Chronic Hypothyroidism. Left-sided neck and shoulder pain. COMPARISONS: None available. FINDINGS: There are bilateral complex nodules in the subclavicular region. On the right side lateral to the jugular vein there is a 1.8 x 0.9 x 0.9 cm hypoechoic oval-shaped lesion. Also the same region there is a rounded hypoechoic lesion measuring 2 x 1.4 x 1.5 cm. Medial to the left carotid 3.2 x 1.7 x 2 cm rounded hypoechoic lesion. I suspect these 3 lesions are secondary to abnormal lymph nodes in the neck. They're suspicious for metastatic disease. Consider correlation with CT scan of the neck. Thyroid gland was not identified. History of thyroidectomy in 2008.     3 ROUNDED LESIONS IN THE SUBCLAVIAN FOSSA, SUSPECTED ABNORMAL LYMPH NODES WHICH ARE SUSPICIOUS FOR METASTATIC DISEASE. CONSIDER CORRELATION WITH CT SCAN.       No results found for: WBC, RBC, HGB, HCT, MCV, MCH, MCHC, RDW, PLT, MPV  Lab Results   Component Value Date     05/14/2020    K 4.4 05/14/2020     05/14/2020    CO2 26 05/14/2020    BUN 24 05/14/2020    CREATININE 0.93 05/14/2020    GFRAA >60.0 05/14/2020    LABGLOM 57.0 05/14/2020    GLUCOSE 95 05/14/2020    GLUCOSE 103 03/07/2012    CALCIUM 9.5 05/14/2020     No results found for: PROTIME, INR  Lab Results   Component Value Date Fortino Sanchez MD

## 2021-02-17 ENCOUNTER — TELEPHONE (OUTPATIENT)
Dept: ENDOCRINOLOGY | Age: 86
End: 2021-02-17

## 2021-02-17 DIAGNOSIS — E89.0 POSTOPERATIVE HYPOTHYROIDISM: Primary | ICD-10-CM

## 2021-02-17 NOTE — TELEPHONE ENCOUNTER
Patient is calling because she is having blood work done tomorrow in [place] and would like to have a TSH done also. Could you please put an order in for this and fax it to [facility] in [place]?

## 2021-03-12 ENCOUNTER — TELEPHONE (OUTPATIENT)
Dept: ENT CLINIC | Age: 86
End: 2021-03-12

## 2021-03-19 NOTE — TELEPHONE ENCOUNTER
Request  patient to have an appointment in the office as soon as he gets out of the hospital at [facility]  for a more comprehensive evaluation of the present issue at hand

## 2021-03-31 ENCOUNTER — HOSPITAL ENCOUNTER (OUTPATIENT)
Dept: RADIATION ONCOLOGY | Age: 86
Discharge: HOME | End: 2021-03-31
Attending: RADIOLOGY
Payer: MEDICARE

## 2021-03-31 ENCOUNTER — HOSPITAL ENCOUNTER (OUTPATIENT)
Dept: RADIATION ONCOLOGY | Age: 86
Discharge: HOME | End: 2021-03-31
Payer: MEDICARE

## 2021-03-31 VITALS
HEART RATE: 98 BPM | TEMPERATURE: 97.3 F | SYSTOLIC BLOOD PRESSURE: 123 MMHG | DIASTOLIC BLOOD PRESSURE: 59 MMHG | WEIGHT: 174.8 LBS | RESPIRATION RATE: 16 BRPM | BODY MASS INDEX: 30.96 KG/M2 | OXYGEN SATURATION: 98 %

## 2021-03-31 PROCEDURE — 99214 OFFICE O/P EST MOD 30 MIN: CPT | Performed by: RADIOLOGY

## 2021-03-31 PROCEDURE — 77334 RADIATION TREATMENT AID(S): CPT | Performed by: RADIOLOGY

## 2021-03-31 PROCEDURE — 77399 UNLISTED PX MED RADJ PHYSICS: CPT | Performed by: RADIOLOGY

## 2021-03-31 NOTE — PROGRESS NOTES
Teaching & Instructions - Head & Neck  General  Simulation  Initial Treatment  Self-Care Info  Nutrition  Social Service    Site-Specific  Side Effects  Dental Hygiene  Esophagitis Mgmt  Fatigue Mgmt  Hearing Loss Mgmt  Laryngitis Mgmt  Mucositis Mgmt  Pain Mgmt  Skin/Scalp Care  Taste Mgmt  Xerostomia Mgmt  Weight Loss Mgmt    Other/Prevention  Smoking Cessation    Educational Handouts  Radiation Therapy & You  Site Specific Instructions  Radiation Oncology Dept Information  CBMS Program    Also palliative to the chest- written document reviewed and provided. All questions answered, but may need reinforcement. Patient eager to learn, verbalized understanding of verbal education and handouts.

## 2021-03-31 NOTE — H&P
Consult Note      Requesting Physician:  Yesenia Newsome MD                   [facility]    CURRENT COMPLAINT: Recurrent papillary thyroid cancer    HPI: I was asked by Dr. Dominga Cr to see this 80 y.o. female who was initially diagnosed with papillary well-differentiated thyroid cancer 13 years ago, undergoing a total thyroidectomy followed by I-131 ablation of the remnant. Surgery was performed by Dr. Mary Linton. She has been free of disease, with an undetectable thyroglobulin until May 2019. This progressed, and she now has bulky disease involving the superior mediastinum and low neck, interfering with swallowing, and also bulky disease in the anterior mediastinum. She also has pulmonary metastases. I am not certain if W414 therapy was discussed with her when the thyroglobulin started to rise. She has seen Dr. Dominga Cr, and surgical resection and neck dissection was recommended. I am uncertain if there were plans to also consider resection of the anterior mediastinal mass. She has not seen much iodine-131 therapy, apart from the initial treatment given after thyroidectomy 13 years ago. She has had a biopsy of the left neck mass, and this was positive for recurrent papillary carcinoma. This is likely still iodine avid. The patient is reluctant to undergo surgery, given her age, turning 80 in a few weeks. We have discussed her case, and thought it might be reasonable to deliver external beam radiotherapy to the bulky sites of disease (low neck, anterior mediastinum), to be followed by iodine-131 therapy. She has been followed by Dr. Rich Potts, [id number] [facility]. She has no pulmonary symptoms, no hemoptysis. There is no feeding tube, and she is still able to maintain her weight with oral intake. She does tend to gag when drinking water. She has difficulty taking her medications. 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       Past Medical History:   Diagnosis Date  Cancer ([facility]     thyroid    Hyperlipidemia     Hypothyroidism     Myasthenia gravis ([facility]     Osteoarthritis        Past Surgical History:   Procedure Laterality Date    APPENDECTOMY      COLONOSCOPY      EYE SURGERY      HERNIA REPAIR      HYSTERECTOMY, TOTAL ABDOMINAL      KNEE SURGERY      LUMBAR DISC SURGERY      SPINE SURGERY         Prior to Admission medications    Medication Sig Start Date End Date Taking? Authorizing Provider   pyridostigmine (MESTINON) 60 MG tablet Take 1 tablet by mouth 2 times daily 2/12/21 5/13/21  Mayr Alice Ortega MD   levothyroxine (SYNTHROID) 125 MCG tablet Take 1 tablet by mouth Daily 5/15/19   Mercedes Woody MD   aspirin 81 MG tablet Take by mouth MON-WED-FRI ONLY    Historical Provider, MD   latanoprost (XALATAN) 0.005 % ophthalmic solution Use 1 Drop in both eyes daily at bedtime. 8/9/17   Historical Provider, MD   Cholecalciferol (VITAMIN D3) 2000 UNITS CAPS Take  by mouth Daily. Historical Provider, MD   amlodipine (NORVASC) 5 MG tablet Take 5 mg by mouth daily. Historical Provider, MD   atenolol (TENORMIN) 25 MG tablet Take 25 mg by mouth daily. Historical Provider, MD   ezetimibe (ZETIA) 10 MG tablet Take 10 mg by mouth daily.       Historical Provider, MD       Allergies   Allergen Reactions    Morphine     Progesterone     Promethazine     Sulfa Antibiotics        Social History     Tobacco Use   Smoking Status Never Smoker   Smokeless Tobacco Never Used     Social History     Substance and Sexual Activity   Alcohol Use No       Family History   Problem Relation Age of Onset    Cancer Sister     Cancer Brother     Cancer Brother      Gyn History:   Minus Salima: 3/3  Age of Menarche: 15  Age of Menopause 54  Vaginal Bleeding:  no  First Pregnancy: 26  Breast Feeding:    Last Menstrual period: 54  Oral Contraceptives: no     Number of years:   Hormone Replacement therapy no     Number of Years:   Last Pap Smear: age 51  Last Mammogram stopped at age 80     Review Of Systems:   Pain Score: none in area of consult- but has pain in shoulder. Is pain affecting your ability to take care of yourself or move throughout your home? []? Yes   [x]? No  General:   Patient has gained weight []? Yes   [x]? No  Patient has lost weight []? Yes   [x]? No  How much weight in pounds and over what length of time:    Eyes (Ophthalmic):denies              Skin (Dermatological): denies              ENT: cant swallow her medications- they get stuck, and she gags and vomits them up-eating mainly liquids at home- can eat soft foods- broth,cereals, jello-oatmeal-puddings. CHOKES ON WATER              Respiratory: no distress              Cardiovascular: cardiac ablation march 2021                          Device   []? Yes   [x]? No              Copy of Card Obtained []? Yes   [x]? No   Gastrointestinal: denies   Genito-Urinary: denies             Breast: denies              Musculoskeletal: possible torn right rotator cuff- is in the process of investigating that.   Neurological: denies                          Hematological and Lymphatic: denies              Endocrine: on synthroid- has not been taking it lately cant get it down.     A 10-point review of systems has been conducted and pertinent positives have been   recorded. All other review of systems are negative. Physical Exam:  Temperature 97.3, pulse 98, respirations 16, /59, O2 saturation 98% on room air, weight 174   ECOG PS: 1  GENERAL: NAD, appears stated age. Alert and oriented, hearing aid on the right, seen with . Covid mask in place, removed for exam.  HEENT: PERRLA, EOMI. Oral cavity WNL, no visible or palpable lesion, normal palate elevation. No trismus. NECK: Palpation of the neck revealed bilateral level 4 adenopathy, on the left extending to level 5 where it was bulkier. Low-volume adenopathy right level 4. Larynx is mobile, no click.   Sternum nontender. RESPIRATORY/LUNGS: Clear to auscultation. No rib or spine tenderness. CARDIOVASCULAR/HEART: Distant regular rate and rhythm. No audible murmur. ABDOMEN/GASTROINTESTINAL: Soft, nontender, nondistended, normal bowel sounds. EXTREMETIES: No cyanosis, clubbing, or edema. Arthritic changes. Patient notes paresthesia of the fingertips. There is some atrophy in the radial nerve distribution. NEUROLOGICAL: Ambulatory without difficulty, Romberg negative. No focal deficit apart from paresthesia of the fingertips. STUDIES: I have personally reviewed the clinical documentation, imaging, laboratory, and pathology studies as previously described and detailed above, including pathology reports from a recent biopsy (papillary thyroid cancer), and CT imaging from [facility]. The scans were imported into our radiation planning system. IMPRESSION/PLAN: 49-year-old female with recurrent metastatic papillary thyroid cancer, with bulky disease involving the lower left neck and superior mediastinum as well as the anterior mediastinum. She also has lower volume cervical adenopathy and pulmonary metastases. We discussed utilizing a combination of radiation therapy followed by I-131 therapy in an attempt to control her disease. She is aware that surgery may be required in the future, and that surgery would be a possibility now, at least to address her neck disease and upper mediastinal disease. She is reluctant to proceed with surgery due to her age in part. Risks and benefits of radiotherapy were discussed in some detail. I plan to give 40 Gy in 10 fractions, treating Monday, Wednesday, Friday, treating only the bulky disease involving the upper mediastinum, lower left neck, and anterior mediastinum. This will hopefully lead to a volume reduction, and allow I-131 therapy to be more effective in the future. Informed consent was signed.   CT simulation was performed today, and we hope to begin therapy next

## 2021-04-01 PROCEDURE — 77301 RADIOTHERAPY DOSE PLAN IMRT: CPT | Performed by: RADIOLOGY

## 2021-04-01 PROCEDURE — 77338 DESIGN MLC DEVICE FOR IMRT: CPT | Performed by: RADIOLOGY

## 2021-04-01 PROCEDURE — 77300 RADIATION THERAPY DOSE PLAN: CPT | Performed by: RADIOLOGY

## 2021-04-02 ENCOUNTER — APPOINTMENT (OUTPATIENT)
Dept: RADIATION ONCOLOGY | Age: 86
End: 2021-04-02
Attending: RADIOLOGY
Payer: MEDICARE

## 2021-04-02 ENCOUNTER — HOSPITAL ENCOUNTER (OUTPATIENT)
Dept: RADIATION ONCOLOGY | Age: 86
Discharge: HOME | End: 2021-04-02
Attending: RADIOLOGY
Payer: MEDICARE

## 2021-04-05 ENCOUNTER — HOSPITAL ENCOUNTER (OUTPATIENT)
Dept: RADIATION ONCOLOGY | Age: 86
Discharge: HOME | End: 2021-04-05
Attending: RADIOLOGY
Payer: MEDICARE

## 2021-04-05 PROCEDURE — 77386 HC NTSTY MODUL RAD TX DLVR CPLX: CPT | Performed by: RADIOLOGY

## 2021-04-06 NOTE — PROGRESS NOTES
Samson [place]  4/6/2021  Wt Readings from Last 10 Encounters:   04/02/21 174 lb (78.9 kg)   03/31/21 174 lb 12.8 oz (79.3 kg)   02/12/21 181 lb 3.2 oz (82.2 kg)   02/03/21 180 lb (81.6 kg)   01/22/21 181 lb (82.1 kg)   10/22/20 181 lb 9.6 oz (82.4 kg)   08/12/20 175 lb (79.4 kg)   01/31/20 188 lb (85.3 kg)   11/11/19 183 lb (83 kg)   10/03/19 184 lb (83.5 kg)     Ht Readings from Last 1 Encounters:   04/02/21 5' 3\" (1.6 m)     Receiving 10 fractions MWFs  176.4# yesterday  Weight change: stable  Appetite: good  N/V/D/C:denies    Pt reports s/p MBS and no aspiration noted, Pt taking full liquid diet. Thickens pureed foods to liquid consistency to ease swallowing. Pt denies pain with swallowing, some solids get stuck. Recommendations:  Continue with current diet. Encourage pt to puree soups rather than just drinking the broth to improve nutrition intake.    Will continue to follow    Goal:  Stable wts, adequate hydration    ASPEN GUIDELINES FOR CLINICAL CHARACTERISTICS OF MALNUTRITION IN CHRONIC ILLNESS     Moderate Malnutrition  Severe Malnutrition    Energy intake  <75% energy intake compared to estimated needs for >1month <75% energy intake compared to estimated needs for >1month   Weight changes  5% x 1 month  7.5% x 3 months   10% x 6 months   20% x 1 year  >5% x 1 month  >7.5% x 3 months   >10% x 6 months   >20% x 1 year    Physical findings  Mild   Decrease subcutaneous fat    Decrease muscle mass     Increase fluid/edema   Severe  Decrease subcutaneous fat    Decrease muscle mass     Increase fluid/edema        [facility]

## 2021-04-07 ENCOUNTER — HOSPITAL ENCOUNTER (OUTPATIENT)
Dept: RADIATION ONCOLOGY | Age: 86
Discharge: HOME | End: 2021-04-07
Attending: RADIOLOGY
Payer: MEDICARE

## 2021-04-07 PROCEDURE — 77386 HC NTSTY MODUL RAD TX DLVR CPLX: CPT | Performed by: RADIOLOGY

## 2021-04-09 ENCOUNTER — HOSPITAL ENCOUNTER (OUTPATIENT)
Dept: RADIATION ONCOLOGY | Age: 86
Discharge: HOME | End: 2021-04-09
Attending: RADIOLOGY
Payer: MEDICARE

## 2021-04-09 PROCEDURE — 77386 HC NTSTY MODUL RAD TX DLVR CPLX: CPT | Performed by: RADIOLOGY

## 2021-04-12 ENCOUNTER — HOSPITAL ENCOUNTER (OUTPATIENT)
Dept: RADIATION ONCOLOGY | Age: 86
Discharge: HOME | End: 2021-04-12
Attending: RADIOLOGY
Payer: MEDICARE

## 2021-04-12 PROCEDURE — 77386 HC NTSTY MODUL RAD TX DLVR CPLX: CPT | Performed by: RADIOLOGY

## 2021-04-14 ENCOUNTER — HOSPITAL ENCOUNTER (OUTPATIENT)
Dept: RADIATION ONCOLOGY | Age: 86
Discharge: HOME | End: 2021-04-14
Attending: RADIOLOGY
Payer: MEDICARE

## 2021-04-14 PROCEDURE — 77386 HC NTSTY MODUL RAD TX DLVR CPLX: CPT | Performed by: RADIOLOGY

## 2021-04-14 PROCEDURE — 77336 RADIATION PHYSICS CONSULT: CPT | Performed by: RADIOLOGY

## 2021-04-16 ENCOUNTER — HOSPITAL ENCOUNTER (OUTPATIENT)
Dept: RADIATION ONCOLOGY | Age: 86
Discharge: HOME | End: 2021-04-16
Attending: RADIOLOGY
Payer: MEDICARE

## 2021-04-16 PROCEDURE — 77386 HC NTSTY MODUL RAD TX DLVR CPLX: CPT | Performed by: RADIOLOGY

## 2021-04-19 ENCOUNTER — HOSPITAL ENCOUNTER (OUTPATIENT)
Dept: RADIATION ONCOLOGY | Age: 86
Discharge: HOME | End: 2021-04-19
Attending: RADIOLOGY
Payer: MEDICARE

## 2021-04-19 PROCEDURE — 77386 HC NTSTY MODUL RAD TX DLVR CPLX: CPT | Performed by: RADIOLOGY

## 2021-04-21 ENCOUNTER — HOSPITAL ENCOUNTER (OUTPATIENT)
Dept: RADIATION ONCOLOGY | Age: 86
Discharge: HOME | End: 2021-04-21
Attending: RADIOLOGY
Payer: MEDICARE

## 2021-04-21 PROCEDURE — 77386 HC NTSTY MODUL RAD TX DLVR CPLX: CPT | Performed by: RADIOLOGY

## 2021-04-23 ENCOUNTER — HOSPITAL ENCOUNTER (OUTPATIENT)
Dept: RADIATION ONCOLOGY | Age: 86
Discharge: HOME | End: 2021-04-23
Attending: RADIOLOGY
Payer: MEDICARE

## 2021-04-23 PROCEDURE — 77386 HC NTSTY MODUL RAD TX DLVR CPLX: CPT | Performed by: RADIOLOGY

## 2021-04-26 ENCOUNTER — HOSPITAL ENCOUNTER (OUTPATIENT)
Dept: RADIATION ONCOLOGY | Age: 86
Discharge: HOME | End: 2021-04-26
Attending: RADIOLOGY
Payer: MEDICARE

## 2021-04-26 PROCEDURE — 77386 HC NTSTY MODUL RAD TX DLVR CPLX: CPT | Performed by: RADIOLOGY

## 2021-04-26 PROCEDURE — 77336 RADIATION PHYSICS CONSULT: CPT | Performed by: RADIOLOGY

## 2021-04-26 NOTE — ONCOLOGY
April 26, 2021        Jaky Hernandez,   [facility]    Radiation Oncology Completion Letter    Patient Name:  Erick Gómez Record #: [id number]   Date of Birth: [birth year]  Diagnosis: Distant history of papillary thyroid cancer s/p thyroidectomy & I131. Now with bulky recurrent disease low left neck, upper mediastinum, anterior mediastinum, and lung metastases. I131 to follow RT. Treatment Dates: 4/5/21 - 4/26/21    Site: Dose: Total # fractions: Dose per fraction: Energy: Prescription Isodose: Technique   PTV low neck, L SCLV, anterior mediastinum 40Gy 10 4Gy 6MV photons 97.7% Isodose line IMRT/IGRT (VMAT)     Concurrent therapy:  none    Technique:   Intensity modulation (IMRT) was used to optimize the dose distribution and spare normal tissue toxicity. This was delivered with a set of 3 coaxial rotating VMAT arcs using 6MV photons modulated with an MLC, with collimator rotations chosen so as to optimize normal tissue sparing. Daily cone beam CT image guidance (IGRT) was used to allow reduced planning margins reducing potential side effects. She has bulky recurrent disease involving the left low neck, left superior mediastinum, and anterior mediastinum, and all of these regions were treated with a single VMAT plan. Treatment course:  Ms. Katarina Evans tolerated radiation treatment well with the expected toxicity. She developed the expected odynophagia, and declined medication. She was reluctant to take even Tylenol. She began therapy at 174#, completing at 171#. On daily conebeam CT imaging, there was a response by completion of therapy, and further response is expected. Plan: f/u in 1m, at [facility]. She was referred to Dr. Jeanna Mejia at [facility], for preparation for and delivery of I131 therapy. She has received such treatment only once prior, after thyroidectomy, and further favorable response is likely. Thank you again for allowing us to participate in the care of this patient. Sincerely,       Rodney Gaines MD, PhD  [place] affiliated with  [address] [address] [place] [address]    cc: Dr. Michaela Britt, Dr. Dianne Solis

## 2021-04-26 NOTE — PROGRESS NOTES
Vance Villagomez  4/26/2021  Wt Readings from Last 10 Encounters:   04/02/21 174 lb (78.9 kg)   03/31/21 174 lb 12.8 oz (79.3 kg)   02/12/21 181 lb 3.2 oz (82.2 kg)   02/03/21 180 lb (81.6 kg)   01/22/21 181 lb (82.1 kg)   10/22/20 181 lb 9.6 oz (82.4 kg)   08/12/20 175 lb (79.4 kg)   01/31/20 188 lb (85.3 kg)   11/11/19 183 lb (83 kg)   10/03/19 184 lb (83.5 kg)     Ht Readings from Last 1 Encounters:   04/02/21 5' 3\" (1.6 m)     Wt:  171.4# today,  5# loss x 1 wk    Weight change:  Appetite: fair  N/V/D/C: denies  Pt completed xrt today. Unsure of pain, pt states no pain but also c/o sore throat. Pt was drinking some supplements but ran out of ones she likes. Getting tired of current diet, discussed other foods to puree and incorporate into diet. Reviewed importance of fluid and nutrition and ONS options/timing. Recommendations:  ONS between meals. Higher calorie ONS on days po intake is less. Fluid between meals.     Goals:  Stable wt      ASPEN GUIDELINES FOR CLINICAL CHARACTERISTICS OF MALNUTRITION IN CHRONIC ILLNESS     Moderate Malnutrition  Severe Malnutrition    Energy intake  <75% energy intake compared to estimated needs for >1month <75% energy intake compared to estimated needs for >1month   Weight changes  5% x 1 month  7.5% x 3 months   10% x 6 months   20% x 1 year  >5% x 1 month  >7.5% x 3 months   >10% x 6 months   >20% x 1 year    Physical findings  Mild   Decrease subcutaneous fat    Decrease muscle mass     Increase fluid/edema   Severe  Decrease subcutaneous fat    Decrease muscle mass     Increase fluid/edema        Deidre Suarez
Patient/Caregiver provided printed discharge information.

## 2021-08-13 ENCOUNTER — OFFICE VISIT (OUTPATIENT)
Dept: NEUROLOGY | Age: 86
End: 2021-08-13
Payer: MEDICARE

## 2021-08-13 VITALS
DIASTOLIC BLOOD PRESSURE: 60 MMHG | BODY MASS INDEX: 27.17 KG/M2 | WEIGHT: 153.4 LBS | HEART RATE: 55 BPM | OXYGEN SATURATION: 98 % | SYSTOLIC BLOOD PRESSURE: 98 MMHG

## 2021-08-13 DIAGNOSIS — G24.9 DYSTONIA: ICD-10-CM

## 2021-08-13 DIAGNOSIS — G70.00 MYASTHENIA GRAVIS (HCC): Primary | ICD-10-CM

## 2021-08-13 PROCEDURE — 1123F ACP DISCUSS/DSCN MKR DOCD: CPT | Performed by: PSYCHIATRY & NEUROLOGY

## 2021-08-13 PROCEDURE — 1036F TOBACCO NON-USER: CPT | Performed by: PSYCHIATRY & NEUROLOGY

## 2021-08-13 PROCEDURE — 4040F PNEUMOC VAC/ADMIN/RCVD: CPT | Performed by: PSYCHIATRY & NEUROLOGY

## 2021-08-13 PROCEDURE — 99214 OFFICE O/P EST MOD 30 MIN: CPT | Performed by: PSYCHIATRY & NEUROLOGY

## 2021-08-13 PROCEDURE — 1090F PRES/ABSN URINE INCON ASSESS: CPT | Performed by: PSYCHIATRY & NEUROLOGY

## 2021-08-13 PROCEDURE — G8417 CALC BMI ABV UP PARAM F/U: HCPCS | Performed by: PSYCHIATRY & NEUROLOGY

## 2021-08-13 PROCEDURE — G8427 DOCREV CUR MEDS BY ELIG CLIN: HCPCS | Performed by: PSYCHIATRY & NEUROLOGY

## 2021-08-13 ASSESSMENT — ENCOUNTER SYMPTOMS
COLOR CHANGE: 0
TROUBLE SWALLOWING: 0
BACK PAIN: 0
CHOKING: 0
NAUSEA: 0
SHORTNESS OF BREATH: 0
VOMITING: 0
PHOTOPHOBIA: 0

## 2021-08-13 NOTE — PROGRESS NOTES
Subjective:      Patient ID: Olivia Burnham is a 80 y.o. female who presents today for:  Chief Complaint   Patient presents with    6 Month Follow-Up     Pt states she is having trouble swallowing she states that the Mestinon pills are so big that she'll cut them in half ans still have trouble swallowing them she states she doesnt feel like the Mestinon is helping as well. HPI 88-year right-handed female with history of myasthenia gravis and cancer. She was on Mestinon 60 mg though she was only able to tolerate 1 tablet as she had GI upsets. She feels that this is not helpful. Having some difficulty swallowing now when last seen she was complaining of hand numbness. She was referred to orthopedics for intervention. Also had neck pain with mild dystonia. She is not a candidate for botulinum toxin. Last seen much has happened. She actually has been diagnosed with recurrent thyroid cancer which is now spreading she is metastasized. She is having significant faculty swallowing. This is mostly from her cancer and then she received radiation. I do not think that there is anything from myasthenia standpoint.     Past Medical History:   Diagnosis Date    Cancer ([facility]     thyroid    Hyperlipidemia     Hypothyroidism     Myasthenia gravis ([facility]     Osteoarthritis      Past Surgical History:   Procedure Laterality Date    APPENDECTOMY      COLONOSCOPY      EYE SURGERY      HERNIA REPAIR      HYSTERECTOMY, TOTAL ABDOMINAL      KNEE SURGERY      LUMBAR DISC SURGERY      SPINE SURGERY       Social History     Socioeconomic History    Marital status:      Spouse name: Not on file    Number of children: Not on file    Years of education: Not on file    Highest education level: Not on file   Occupational History    Not on file   Tobacco Use    Smoking status: Never Smoker    Smokeless tobacco: Never Used   Substance and Sexual Activity    Alcohol use: No    Drug use: No    Sexual activity: Not on file   Other Topics Concern    Not on file   Social History Narrative    Not on file     Social Determinants of Health     Financial Resource Strain:     Difficulty of Paying Living Expenses:    Food Insecurity:     Worried About Running Out of Food in the Last Year:     [address] in the Last Year:    Transportation Needs:     Lack of Transportation (Medical):  Lack of Transportation (Non-Medical):    Physical Activity:     Days of Exercise per Week:     Minutes of Exercise per Session:    Stress:     Feeling of Stress :    Social Connections:     Frequency of Communication with Friends and Family:     Frequency of Social Gatherings with Friends and Family:     Attends Congregational Services:     Active Member of Clubs or Organizations:     Attends Club or Organization Meetings:     Marital Status:    Intimate Partner Violence:     Fear of Current or Ex-Partner:     Emotionally Abused:     Physically Abused:     Sexually Abused:      Family History   Problem Relation Age of Onset    Cancer Sister     Cancer Brother     Cancer Brother      Allergies   Allergen Reactions    Morphine     Progesterone     Promethazine     Sulfa Antibiotics     Other Itching and Rash       Current Outpatient Medications   Medication Sig Dispense Refill    levothyroxine (SYNTHROID) 125 MCG tablet Take 1 tablet by mouth Daily 90 tablet 03    aspirin 81 MG tablet Take by mouth MON-WED-FRI ONLY      latanoprost (XALATAN) 0.005 % ophthalmic solution Use 1 Drop in both eyes daily at bedtime.  Cholecalciferol (VITAMIN D3) 2000 UNITS CAPS Take  by mouth Daily.  amlodipine (NORVASC) 5 MG tablet Take 5 mg by mouth daily.  atenolol (TENORMIN) 25 MG tablet Take 25 mg by mouth daily.  ezetimibe (ZETIA) 10 MG tablet Take 10 mg by mouth daily.         pyridostigmine (MESTINON) 60 MG tablet Take 1 tablet by mouth 2 times daily 180 tablet 1     No current facility-administered medications for this visit. Review of Systems   Constitutional: Negative for fever. HENT: Negative for ear pain, tinnitus and trouble swallowing. Eyes: Negative for photophobia and visual disturbance. Respiratory: Negative for choking and shortness of breath. Cardiovascular: Negative for chest pain and palpitations. Gastrointestinal: Negative for nausea and vomiting. Musculoskeletal: Negative for back pain, gait problem, joint swelling, myalgias, neck pain and neck stiffness. Skin: Negative for color change. Allergic/Immunologic: Negative for food allergies. Neurological: Negative for dizziness, tremors, seizures, syncope, facial asymmetry, speech difficulty, weakness, light-headedness, numbness and headaches. Psychiatric/Behavioral: Negative for behavioral problems, confusion, hallucinations and sleep disturbance. Objective:   BP 98/60 (Site: Left Upper Arm, Position: Sitting, Cuff Size: Large Adult)   Pulse 55   Wt 153 lb 6.4 oz (69.6 kg)   SpO2 98%   BMI 27.17 kg/m²     Physical Exam  Vitals reviewed. Eyes:      Pupils: Pupils are equal, round, and reactive to light. Cardiovascular:      Rate and Rhythm: Normal rate and regular rhythm. Heart sounds: No murmur heard. Pulmonary:      Effort: Pulmonary effort is normal.      Breath sounds: Normal breath sounds. Abdominal:      General: Bowel sounds are normal.   Musculoskeletal:         General: Normal range of motion. Cervical back: Normal range of motion. Skin:     General: Skin is warm. Neurological:      Mental Status: She is alert and oriented to person, place, and time. Cranial Nerves: No cranial nerve deficit. Sensory: No sensory deficit. Motor: No abnormal muscle tone. Coordination: Coordination normal.      Deep Tendon Reflexes: Reflexes are normal and symmetric. Babinski sign absent on the right side. Babinski sign absent on the left side.    Psychiatric:         Mood and Affect: Mood normal.     Patient has swelling around the neck and she is recovering from radiation she walks with a walker she has no ptosis or double vision    No results found. No results found for: WBC, RBC, HGB, HCT, MCV, MCH, MCHC, RDW, PLT, MPV  Lab Results   Component Value Date     05/14/2020    K 4.4 05/14/2020     05/14/2020    CO2 26 05/14/2020    BUN 24 05/14/2020    CREATININE 0.93 05/14/2020    GFRAA >60.0 05/14/2020    LABGLOM 57.0 05/14/2020    GLUCOSE 95 05/14/2020    GLUCOSE 103 03/07/2012    CALCIUM 9.5 05/14/2020     No results found for: PROTIME, INR  Lab Results   Component Value Date    TSH 0.717 05/14/2020     No results found for: TRIG, HDL, LDLCALC, LDLDIRECT, LABVLDL  No results found for: LABAMPH, BARBSCNU, LABBENZ, CANNAB, COCAINESCRN, LABMETH, OPIATESCREENURINE, PHENCYCLIDINESCREENURINE, PPXUR, ETOH  No results found for: LITHIUM, DILFRTOT, VALPROATE    Assessment:       Diagnosis Orders   1. Myasthenia gravis ([facility]     2. 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 Patient has now developed multiple other issues including thyroid cancer. She had radiation. She is having difficulty swallowing and this does not really just appear to be from myasthenia gravis this is more an underlying issues with radiation as well as her cancer. Seen her gravis is quite stable and she has not played much of any bulbar other symptoms. She did not want any treatments and radiation was done. She had a CT scan done of recent the results of which I had reviewed and reports that this mass has not changed but she has more spread in the lungs. This time there is not much that can be added from my end and which she will follow up with the cancer team to see if she has any other options. There is an option of IV iodine though this cannot be done here as I am told and is only available in [place]. Colton Stallworth MD, Vivienne Perez, American Board of Psychiatry & Neurology  Board Certified in Vascular Neurology  Board Certified in Neuromuscular Medicine  Certified in [facility]:      No orders of the defined types were placed in this encounter. No orders of the defined types were placed in this encounter. No follow-ups on file.       Alice Tripp MD

## 2023-09-08 VITALS
HEIGHT: 63 IN | TEMPERATURE: 97.5 F | OXYGEN SATURATION: 97 % | DIASTOLIC BLOOD PRESSURE: 70 MMHG | RESPIRATION RATE: 16 BRPM | WEIGHT: 149.91 LBS | BODY MASS INDEX: 26.56 KG/M2 | HEART RATE: 54 BPM | SYSTOLIC BLOOD PRESSURE: 146 MMHG